# Patient Record
Sex: FEMALE | Race: BLACK OR AFRICAN AMERICAN
[De-identification: names, ages, dates, MRNs, and addresses within clinical notes are randomized per-mention and may not be internally consistent; named-entity substitution may affect disease eponyms.]

---

## 2017-12-21 ENCOUNTER — HOSPITAL ENCOUNTER (EMERGENCY)
Dept: HOSPITAL 62 - ER | Age: 32
LOS: 1 days | Discharge: HOME | End: 2017-12-22
Payer: MEDICAID

## 2017-12-21 VITALS — DIASTOLIC BLOOD PRESSURE: 103 MMHG | SYSTOLIC BLOOD PRESSURE: 160 MMHG

## 2017-12-21 DIAGNOSIS — I10: ICD-10-CM

## 2017-12-21 DIAGNOSIS — Z87.01: ICD-10-CM

## 2017-12-21 DIAGNOSIS — F17.200: ICD-10-CM

## 2017-12-21 DIAGNOSIS — R06.02: ICD-10-CM

## 2017-12-21 DIAGNOSIS — Z71.6: ICD-10-CM

## 2017-12-21 DIAGNOSIS — E66.01: ICD-10-CM

## 2017-12-21 DIAGNOSIS — J06.9: Primary | ICD-10-CM

## 2017-12-21 DIAGNOSIS — R05: ICD-10-CM

## 2017-12-21 PROCEDURE — 99283 EMERGENCY DEPT VISIT LOW MDM: CPT

## 2017-12-21 NOTE — ER DOCUMENT REPORT
ED General





- General


Chief Complaint: Cold Symptoms


Stated Complaint: COUGH,CONGESTION


Time Seen by Provider: 17 23:52


Notes: 





32-year-old female with morbid obesity and daily smoking presents with 

congestion cough and shortness of breath worse at night with subjective 

wheezing moderate.  3 days..  She is coughing up phlegm but no blood.  She has 

no leg swelling or chest pain.  This happens every year and she has had 

pneumonia before.


TRAVEL OUTSIDE OF THE [place] IN LAST 30 DAYS: No





- Related Data


Allergies/Adverse Reactions: 


 





No Known Allergies Allergy (Verified 17 15:17)


 











Past Medical History





- Social History


Smoking Status: Current Every Day Smoker


Chew tobacco use (# tins/day): No


Smoking Education Provided: Yes - The patient ED visit today was directly 

related to their abuse of tobacco. 


Frequency of alcohol use: Rare


Drug Abuse: None


Family History: Reviewed & Not Pertinent


Patient has suicidal ideation: No


Patient has homicidal ideation: No





- Past Medical History


Cardiac Medical History: Reports: Hx Hypertension


Pulmonary Medical History: Reports: Hx Bronchitis


   Denies: Hx Tuberculosis


Renal/ Medical History: Denies: Hx Kidney Stones, Hx Peritoneal Dialysis, Hx 

Pelvic Inflammatory Disease


Psychiatric Medical History: Reports: Hx Depression


Past Surgical History: Reports: Hx  Section - x1, Hx Cholecystectomy.  

Denies: Hx Pacemaker





- Immunizations


Hx Diphtheria, Pertussis, Tetanus Vaccination: Yes


Hx Pneumococcal Vaccination: 12





Review of Systems





- Review of Systems


Notes: 





REVIEW OF SYSTEMS





GEN: Denies fever, chills, weight loss


ENT: Denies sore throat, nasal discharge, ear pain


EYES: Denies blurry vision, eye pain, discharge


CV: Denies chest pain, palpitations, edema


RESP: Cough congestion shortness of breath


GI: Denies abdominal pain, nausea, vomiting, diarrhea


MSK: Denies joint pain/swelling, edema, 


SKIN: Denies rash, skin lesions


LYMPH: Denies swollen glands/lymph nodes


NEURO: Denies headache, focal weakness or numbness, dizziness


PSYCH: Denies depression, suicidal or homicidal ideation








PHYSICAL EXAMINATION





General: No distress, morbid obesity


Head: Atraumatic, normocephalic


ENT: Mouth normal, oropharynx moist, no exudates or tonsillar enlargement


Eyes: Conjunctiva normal, pupils equal, lids normal


Neck: No JVD, supple, no guarding


CVS: Normal rate, regular rhythm, no murmurs


Resp: No resp distress, equal and normal breath sounds bilaterally


GI: Nondistended, soft, no tenderness to palpation, no rebound or guarding


Ext: No deformities, no edema, normal range of motion in upper and lower ext


Back: No CVA or midline TTP


Skin: No rash, warm


Lymphatic: No lymphadeopathy noted


Neuro: Awake, alert.  Face symmetric.  GCS 15.





Physical Exam





- Vital signs


Vitals: 


 











Temp Pulse Resp BP Pulse Ox


 


 98.6 F   99   18   160/103 H  96 


 


 17 23:12  17 23:12  17 23:12  17 23:12  17 23:12














Course





- Re-evaluation


Re-evalutation: 





17 23:58


.  Morbidly obese smoker presents with cough congestion shortness of breath.  

She is not wheezing her oxygen level is normal, and she is well-appearing.  

Possible bronchitis but doubt pneumonia, no need for x-ray at this time.  Will 

prescribe inhaler and gave smoking cessation counseling.


I have discussed with the patient there likely diagnosis, aftercare plan, follow

-up plans and my usual and customary return precautions.  They verbalized 

understanding of this.





- Vital Signs


Vital signs: 


 











Temp Pulse Resp BP Pulse Ox


 


 98.6 F   99   18   160/103 H  96 


 


 17 23:12  17 23:12  17 23:12  17 23:12  17 23:12














Discharge





- Discharge


Clinical Impression: 


Upper respiratory infection


Qualifiers:


 URI type: unspecified URI Qualified Code(s): J06.9 - Acute upper respiratory 

infection, unspecified





Condition: Good


Disposition: HOME, SELF-CARE


Instructions:  Upper Respiratory Infection, Infant or Child ([facility])


Prescriptions: 


Albuterol Sulfate [Proair HFA Inhalation Aerosol 8.5 gm MDI] 2 puff IH Q4H PRN #

1 mdi


 PRN Reason:

## 2018-06-10 ENCOUNTER — HOSPITAL ENCOUNTER (EMERGENCY)
Dept: HOSPITAL 62 - ER | Age: 33
Discharge: HOME | End: 2018-06-10
Payer: MEDICAID

## 2018-06-10 VITALS — DIASTOLIC BLOOD PRESSURE: 105 MMHG | SYSTOLIC BLOOD PRESSURE: 157 MMHG

## 2018-06-10 DIAGNOSIS — I10: ICD-10-CM

## 2018-06-10 DIAGNOSIS — Z87.891: ICD-10-CM

## 2018-06-10 DIAGNOSIS — R05: ICD-10-CM

## 2018-06-10 DIAGNOSIS — B95.5: ICD-10-CM

## 2018-06-10 DIAGNOSIS — J36: Primary | ICD-10-CM

## 2018-06-10 DIAGNOSIS — H92.01: ICD-10-CM

## 2018-06-10 LAB
ADD MANUAL DIFF: NO
ANION GAP SERPL CALC-SCNC: 14 MMOL/L (ref 5–19)
BASOPHILS # BLD AUTO: 0.1 10^3/UL (ref 0–0.2)
BASOPHILS NFR BLD AUTO: 0.6 % (ref 0–2)
BUN SERPL-MCNC: 9 MG/DL (ref 7–20)
CALCIUM: 9.3 MG/DL (ref 8.4–10.2)
CHLORIDE SERPL-SCNC: 105 MMOL/L (ref 98–107)
CO2 SERPL-SCNC: 25 MMOL/L (ref 22–30)
EOSINOPHIL # BLD AUTO: 0 10^3/UL (ref 0–0.6)
EOSINOPHIL NFR BLD AUTO: 0.4 % (ref 0–6)
ERYTHROCYTE [DISTWIDTH] IN BLOOD BY AUTOMATED COUNT: 17 % (ref 11.5–14)
GLUCOSE SERPL-MCNC: 103 MG/DL (ref 75–110)
HCT VFR BLD CALC: 45.2 % (ref 36–47)
HGB BLD-MCNC: 14.2 G/DL (ref 12–15.5)
LYMPHOCYTES # BLD AUTO: 1.9 10^3/UL (ref 0.5–4.7)
LYMPHOCYTES NFR BLD AUTO: 15.4 % (ref 13–45)
MCH RBC QN AUTO: 22.9 PG (ref 27–33.4)
MCHC RBC AUTO-ENTMCNC: 31.5 G/DL (ref 32–36)
MCV RBC AUTO: 73 FL (ref 80–97)
MONOCYTES # BLD AUTO: 1 10^3/UL (ref 0.1–1.4)
MONOCYTES NFR BLD AUTO: 7.9 % (ref 3–13)
NEUTROPHILS # BLD AUTO: 9.2 10^3/UL (ref 1.7–8.2)
NEUTS SEG NFR BLD AUTO: 75.7 % (ref 42–78)
PLATELET # BLD: 295 10^3/UL (ref 150–450)
POTASSIUM SERPL-SCNC: 4 MMOL/L (ref 3.6–5)
RBC # BLD AUTO: 6.21 10^6/UL (ref 3.72–5.28)
SODIUM SERPL-SCNC: 144.4 MMOL/L (ref 137–145)
TOTAL CELLS COUNTED % (AUTO): 100 %
WBC # BLD AUTO: 12.2 10^3/UL (ref 4–10.5)

## 2018-06-10 PROCEDURE — 80048 BASIC METABOLIC PNL TOTAL CA: CPT

## 2018-06-10 PROCEDURE — 96365 THER/PROPH/DIAG IV INF INIT: CPT

## 2018-06-10 PROCEDURE — 85025 COMPLETE CBC W/AUTO DIFF WBC: CPT

## 2018-06-10 PROCEDURE — 84703 CHORIONIC GONADOTROPIN ASSAY: CPT

## 2018-06-10 PROCEDURE — 87880 STREP A ASSAY W/OPTIC: CPT

## 2018-06-10 PROCEDURE — 36415 COLL VENOUS BLD VENIPUNCTURE: CPT

## 2018-06-10 PROCEDURE — 87040 BLOOD CULTURE FOR BACTERIA: CPT

## 2018-06-10 PROCEDURE — 99284 EMERGENCY DEPT VISIT MOD MDM: CPT

## 2018-06-10 PROCEDURE — 70491 CT SOFT TISSUE NECK W/DYE: CPT

## 2018-06-10 PROCEDURE — 96361 HYDRATE IV INFUSION ADD-ON: CPT

## 2018-06-10 PROCEDURE — 96375 TX/PRO/DX INJ NEW DRUG ADDON: CPT

## 2018-06-10 NOTE — RADIOLOGY REPORT (SQ)
EXAM DESCRIPTION:  CT SOFT TISSUE NECK WITH



COMPLETED DATE/TIME:  6/10/2018 8:49 pm



REASON FOR STUDY:  swelling to the R throat/neck



COMPARISON:  None.



TECHNIQUE:  Post IV contrasted scanning from skull base through lung apices with review of bone, soft
 tissue and lung windows.  Reconstructed coronal and sagittal MPR images reviewed.  All images stored
 on PACS.

All CT scanners at this facility use dose modulation, iterative reconstruction, and/or weight based d
osing when appropriate to reduce radiation dose to as low as reasonably achievable (ALARA).

[facility]: Dose Right  [facility]: CareDose    [facility]: Dose Right    [facility]: Teradose 4D    [facility]: Smart Technologies



CONTRAST TYPE AND DOSE:  contrast/concentration: Isovue 370.00 mg/ml; Total Contrast Delivered: 75.0 
ml; Total Saline Delivered: 55.0 ml



RENAL FUNCTION:  None required. The patient is less than 50 years old.



RADIATION DOSE:  CT Rad equipment meets quality standard of care and radiation dose reduction techniq
ues were employed. CTDIvol: 21.0 mGy. DLP: 627 mGy-cm. .



LIMITATIONS:  There is artifact from the patient's body habitus.  There is streak artifact from the d
ental amalgam.



FINDINGS:  SKULL BASE: Intact.

MAJOR SALIVARY GLANDS:  No inflammatory changes.

LYMPHADENOPATHY: There are bilateral enlarged cervical lymph nodes.  A right internal jugular lymph c
jarret lymph node is measuring 1.9 x 2.0 cm.  The left internal jugular chain lymph node is measuring 1
.9 x 1.5 cm

MUCOSAL MASSES OR ASYMMETRY: The palatine tonsils are enlarged.  There is a 1.4 x 1.7 cm low attenuat
ion area at the right tonsil.  The adenoids are enlarged.

LARYNX/CORDS: No obvious abnormal findings.

VASCULAR STRUCTURES: The major vessels are patent.

LUNG APICES: Clear.

BONES: Intact.

THYROID: No obvious masses.

PARANASAL SINUSES: No air-fluid levels.

OTHER: Radiopaque piercing is noted at the soft tissues overlying the right maxilla.



IMPRESSION:  1. Enlarged adenoids and palatine tonsils. A 1.4 x 1.7 cm low attenuation area at the ri
ght tonsil, may represent a peritonsillar abscess.

2. Bilateral cervical adenopathy.



TECHNICAL DOCUMENTATION:  JOB ID:  [id number]

OH-

Quality ID # 436: Final reports with documentation of one or more dose reduction techniques (e.g., Au
tomated exposure control, adjustment of the mA and/or kV according to patient size, use of iterative 
reconstruction technique)

 2011 [employer]- All Rights Reserved



Reading location - IP/workstation name: [email or web address]

## 2018-06-10 NOTE — ER DOCUMENT REPORT
ED General





- General


Chief Complaint: Cold Symptoms


Stated Complaint: COUGHING/THROAT PAIN


Time Seen by Provider: 06/10/18 17:04


Mode of Arrival: Ambulatory


Notes: 





32-year-old female presents emergency department with sore throat, right ear 

pain, cough for the last 2 days.  Patient states that her daughter has similar 

symptoms.  Patient's been taking over-the-counter medication with minimal 

relief of symptoms.  Patient states that she is able to swallow and has been 

eating and drinking despite her throat pain.


TRAVEL OUTSIDE OF THE [place] IN LAST 30 DAYS: No





- HPI


Onset: Last week


Onset/Duration: Gradual


Quality of pain: Achy


Severity: Mild


Associated symptoms: Nonproductive cough, Earache, Sore throat


Exacerbated by: Denies


Relieved by: Denies


Similar symptoms previously: No


Recently seen / treated by doctor: No





- Related Data


Allergies/Adverse Reactions: 


 





No Known Allergies Allergy (Verified 17 15:17)


 











Past Medical History





- General


Information source: Patient





- Social History


Smoking Status: Former Smoker


Family History: Reviewed & Not Pertinent





- Past Medical History


Cardiac Medical History: Reports: Hx Hypertension


Pulmonary Medical History: Reports: Hx Bronchitis


   Denies: Hx Tuberculosis


Renal/ Medical History: Denies: Hx Kidney Stones, Hx Peritoneal Dialysis, Hx 

Pelvic Inflammatory Disease


Psychiatric Medical History: Reports: Hx Depression


Past Surgical History: Reports: Hx  Section - x1, Hx Cholecystectomy.  

Denies: Hx Pacemaker





- Immunizations


Hx Diphtheria, Pertussis, Tetanus Vaccination: Yes


Hx Pneumococcal Vaccination: 12





Review of Systems





- Review of Systems


Constitutional: No symptoms reported


EENT: Throat pain


Cardiovascular: No symptoms reported


Respiratory: Cough


Gastrointestinal: No symptoms reported


Genitourinary: No symptoms reported


Musculoskeletal: No symptoms reported


Skin: No symptoms reported


Neurological/Psychological: No symptoms reported


-: Yes All other systems reviewed and negative





Physical Exam





- Vital signs


Vitals: 


 











Temp Pulse Resp BP Pulse Ox


 


 99.6 F   109 H  20   161/126 H  99 


 


 06/10/18 17:01  06/10/18 17:01  06/10/18 17:01  06/10/18 17:01  06/10/18 17:01














- Notes


Notes: 





PHYSICAL EXAMINATION:





GENERAL: Well-appearing, well-nourished and in no acute distress.





HEAD: Atraumatic, normocephalic.





EYES: Pupils equal round and reactive to light, extraocular movements intact, 

conjunctiva are normal.





ENT: Nares patent, No trismus. Erythema to the R posterior pharynx. Uvula 

midline. Tender anterior cervical lymph nodes. Handling secretions. 





NECK: Normal range of motion, supple, anterior cervical lymphadenopathy. 





LUNGS: Breath sounds clear to auscultation bilaterally and equal.  No wheezes 

rales or rhonchi.





HEART: Regular rate and rhythm without murmurs





ABDOMEN: Soft, nontender, nondistended abdomen.  No guarding, no rebound.  No 

masses appreciated.





Female : deferred





Musculoskeletal: Normal range of motion, no pitting or edema.  No cyanosis.





NEUROLOGICAL: Cranial nerves grossly intact.  Normal speech, normal gait.  

Normal sensory, motor exams





PSYCH: Normal mood, normal affect.





SKIN: Warm, Dry, normal turgor, no rashes or lesions noted.





Course





- Re-evaluation


Re-evalutation: 





06/10/18 22:45


I spoke with Dr. Freddy Miller, ENT, at [facility]. As the patient is not 

having difficulty breathing or swallowing and the size of the peritonsillar 

abscess is small, he would like the patient to follow up in his office tomorrow 

AM. He says he can see the patient at 8AM. I will provide the patient with his 

office address and phone number. Patient received clindamycin and steroids in 

the ED. He would like the patient to be discharged with a rx for clindamycin 

and predisone. I discussed the plan of care with the patient. She has 

transportation to Dr. Miller's office in the AM. Patient is reliable. Patient 

told that if she begins having difficulty breathing or swallowing to return to 

the ED immediately. 





- Vital Signs


Vital signs: 


 











Temp Pulse Resp BP Pulse Ox


 


 99.6 F   109 H  21 H  157/105 H  95 


 


 06/10/18 17:01  06/10/18 17:01  06/10/18 20:01  06/10/18 20:01  06/10/18 20:01














- Laboratory


Result Diagrams: 


 06/10/18 18:00





 06/10/18 18:00


Laboratory results interpreted by me: 


 











  06/10/18





  18:00


 


WBC  12.2 H


 


RBC  6.21 H


 


MCV  73 L


 


MCH  22.9 L


 


MCHC  31.5 L


 


RDW  17.0 H


 


Absolute Neutrophils  9.2 H














Discharge





- Discharge


Clinical Impression: 


 Peritonsillar abscess, Strep throat





Condition: Stable


Disposition: HOME, SELF-CARE


Instructions:  Strep Throat ([facility]), Nina-Tonsillar Abscess ([facility])


Additional Instructions: 


Follow up with Dr. Freddy Miller in the office tomorrow:





[address] [place], [address] [phone]


Prescriptions: 


Clindamycin HCl [Cleocin 300 mg Capsule] 300 mg PO Q6 #28 capsule


Prednisone 50 mg PO DAILY #5 tablet


Referrals: 


MARYCRUZ OSEGUERA PA-C [Primary Care Provider] - Follow up as needed


FREDDY MILLER MD [ACTIVE STAFF] - Follow up as needed

## 2018-06-10 NOTE — ER DOCUMENT REPORT
ED Medical Screen (RME)





- General


Chief Complaint: Cold Symptoms


Stated Complaint: COUGHING/THROAT PAIN


Time Seen by Provider: 06/10/18 17:04


Mode of Arrival: Ambulatory


Notes: 





Patient is a 32-year-old female who presents emergency department the chief 

complaint of sore throat that started on Friday.  Patient admits to muffled 

speech starting yesterday and inability to tolerate secretions for the past 48 

hours.  Patient denies any fevers and admits to shortness of breath.


TRAVEL OUTSIDE OF THE [place] IN LAST 30 DAYS: No





- Related Data


Allergies/Adverse Reactions: 


 





No Known Allergies Allergy (Verified 17 15:17)


 











Past Medical History





- Past Medical History


Cardiac Medical History: Reports: Hx Hypertension


Pulmonary Medical History: Reports: Hx Bronchitis


   Denies: Hx Tuberculosis


Renal/ Medical History: Denies: Hx Kidney Stones, Hx Peritoneal Dialysis, Hx 

Pelvic Inflammatory Disease


Psychiatric Medical History: Reports: Hx Depression


Past Surgical History: Reports: Hx  Section - x1, Hx Cholecystectomy.  

Denies: Hx Pacemaker





- Immunizations


Hx Diphtheria, Pertussis, Tetanus Vaccination: Yes





Physical Exam





- Vital signs


Vitals: 





 











Temp Pulse Resp BP Pulse Ox


 


 99.6 F   109 H  20   161/126 H  99 


 


 06/10/18 17:01  06/10/18 17:01  06/10/18 17:01  06/10/18 17:01  06/10/18 17:01














- Notes


Notes: 





HEENT: NCAT, pale conjunctiva, extraocular movements intact, pupils PERRL. 

external ear normal, no evidence of external auditory canal tenderness, blood/

drainage, cerumen impaction, TM intact without evidence of effusion, bulging, 

injection, MMM, Uvula midline.  Airway patent. Evidence of right tonsillar 

enlargement, no visible peritonsillar abscess, retropharyngeal abscess.





patients voice is muffled and continuously spitting into a cup 





Course





- Vital Signs


Vital signs: 





 











Temp Pulse Resp BP Pulse Ox


 


 99.6 F   109 H  20   161/126 H  99 


 


 06/10/18 17:01  06/10/18 17:01  06/10/18 17:01  06/10/18 17:01  06/10/18 17:01














Doctor's Discharge





- Discharge


Referrals: 


MARYCRUZ OSEGUERA PA-C [Primary Care Provider] - Follow up as needed

## 2018-06-10 NOTE — ER DOCUMENT REPORT
ED Medical Screen (RME)





- General


Chief Complaint: Cold Symptoms


Stated Complaint: COUGHING/THROAT PAIN


Time Seen by Provider: 06/10/18 17:04


Mode of Arrival: Ambulatory


Information source: Patient


Notes: 





32-year-old morbidly obese female is complaining of sore throat tonsillitis 

since Friday.  She did see her primary care doctor on Friday but the symptoms 

started after that.  She is unable to eat or drink because of the pain.  She 

does have hot potato voice but upon exam she does not have a peritonsillar 

abscess there is increased redness in the soft palate on the right but the 

uvula is midline and there is no edema.  Bilateral tonsils are exudative with 

tender anterior cervical nodes.  Her blood pressure is elevated 161/126 she 

states she took her blood pressure medicine this morning but she states it is 

like this because of the pain.  We will recheck it and she denies diabetes.


TRAVEL OUTSIDE OF THE [place] IN LAST 30 DAYS: No





- Related Data


Allergies/Adverse Reactions: 


 





No Known Allergies Allergy (Verified 17 15:17)


 











Past Medical History





- Past Medical History


Cardiac Medical History: Reports: Hx Hypertension


Pulmonary Medical History: Reports: Hx Bronchitis


   Denies: Hx Tuberculosis


Renal/ Medical History: Denies: Hx Kidney Stones, Hx Peritoneal Dialysis, Hx 

Pelvic Inflammatory Disease


Psychiatric Medical History: Reports: Hx Depression


Past Surgical History: Reports: Hx  Section - x1, Hx Cholecystectomy.  

Denies: Hx Pacemaker





- Immunizations


Hx Diphtheria, Pertussis, Tetanus Vaccination: Yes





Physical Exam





- Vital signs


Vitals: 





 











Temp Pulse Resp BP Pulse Ox


 


 99.6 F   109 H  20   161/126 H  99 


 


 06/10/18 17:01  06/10/18 17:01  06/10/18 17:01  06/10/18 17:01  06/10/18 17:01














Course





- Vital Signs


Vital signs: 





 











Temp Pulse Resp BP Pulse Ox


 


 99.6 F   109 H  20   161/126 H  99 


 


 06/10/18 17:01  06/10/18 17:01  06/10/18 17:01  06/10/18 17:01  06/10/18 17:01














Doctor's Discharge





- Discharge


Referrals: 


MARYCRUZ OSEGUERA PA-C [Primary Care Provider] - Follow up as needed

## 2018-06-10 NOTE — ER DOCUMENT REPORT
HPI





- HPI


Pain Level: 3





- REPRODUCTIVE


Reproductive: REPORTS: Pregnant:





Past Medical History





- Social History


Family History: Reviewed & Not Pertinent





- Past Medical History


Cardiac Medical History: Reports: Hx Hypertension


Pulmonary Medical History: Reports: Hx Bronchitis


   Denies: Hx Tuberculosis


Renal/ Medical History: Denies: Hx Kidney Stones, Hx Peritoneal Dialysis, Hx 

Pelvic Inflammatory Disease


Psychiatric Medical History: Reports: Hx Depression


Past Surgical History: Reports: Hx  Section - x1, Hx Cholecystectomy.  

Denies: Hx Pacemaker





- Immunizations


Hx Diphtheria, Pertussis, Tetanus Vaccination: Yes


Hx Pneumococcal Vaccination: 12





Vertical Provider Document





- INFECTION CONTROL


TRAVEL OUTSIDE OF THE [place] IN LAST 30 DAYS: No





Course





- Vital Signs


Vital signs: 


 











Temp Pulse Resp BP Pulse Ox


 


 99.6 F   109 H  20   161/126 H  99 


 


 06/10/18 17:01  06/10/18 17:01  06/10/18 17:01  06/10/18 17:01  06/10/18 17:01














Discharge





- Discharge


Referrals: 


MARYCRUZ OSEGUERA PA-C [Primary Care Provider] - Follow up as needed

## 2019-04-02 ENCOUNTER — HOSPITAL ENCOUNTER (EMERGENCY)
Dept: HOSPITAL 62 - ER | Age: 34
Discharge: HOME | End: 2019-04-02
Payer: MEDICAID

## 2019-04-02 VITALS — DIASTOLIC BLOOD PRESSURE: 123 MMHG | SYSTOLIC BLOOD PRESSURE: 157 MMHG

## 2019-04-02 DIAGNOSIS — F17.200: ICD-10-CM

## 2019-04-02 DIAGNOSIS — E66.9: ICD-10-CM

## 2019-04-02 DIAGNOSIS — Z79.899: ICD-10-CM

## 2019-04-02 DIAGNOSIS — I10: ICD-10-CM

## 2019-04-02 DIAGNOSIS — M25.512: Primary | ICD-10-CM

## 2019-04-02 PROCEDURE — 93005 ELECTROCARDIOGRAM TRACING: CPT

## 2019-04-02 PROCEDURE — 93010 ELECTROCARDIOGRAM REPORT: CPT

## 2019-04-02 PROCEDURE — 99283 EMERGENCY DEPT VISIT LOW MDM: CPT

## 2019-04-02 NOTE — ER DOCUMENT REPORT
HPI





- HPI


Time Seen by Provider: 19 16:06


Pain Level: 3


Notes: 





Patient is a 33-year-old female with no significant past medical history aside 

from hypertension and obesity who presents emergency department complaining of 

left pain near her deltoid that has been constant over the last day and a half. 

Patient states that the pain is worse after she gets done performing activities,

but is described as mild.  Pain does not radiate.  She can isolate the one spot 

that hurts when she pushes on it to reproduce her symptoms.  Denies drug 

allergies.  No significant cardiopulmonary medical history.  She is otherwise 

eating and drinking without difficulty.  She is urinating normally and having 

normal bowel movements.  She is able to ambulate without any worsening pain or 

dyspnea on exertion.  She has not taken her blood pressure medicine in a couple 

weeks because she has been out.  She is on lisinopril 10 mg daily.  Denies any 

headache, fever, neck pain, URI, sore throat, chest pain, palpitations, syncope,

cough, shortness of breath, wheeze, dyspnea, abdominal pain, 

nausea/vomiting/diarrhea, urinary retention, dysuria, hematuria, loss of control

of bowel or bladder, numbness/tingling, saddle anesthesia, muscle 

paralysis/weakness, or rash.





- ROS


Systems Reviewed and Negative: Yes All other systems reviewed and negative





- REPRODUCTIVE


Reproductive: DENIES: Pregnant:





- DERM


Skin Color: Normal





Past Medical History





- Social History


Smoking Status: Current Every Day Smoker


Frequency of alcohol use: None


Drug Abuse: None


Family History: Reviewed & Not Pertinent


Patient has suicidal ideation: No


Patient has homicidal ideation: No





- Past Medical History


Cardiac Medical History: Reports: Hx Hypertension


Pulmonary Medical History: Reports: Hx Bronchitis


   Denies: Hx Tuberculosis


Renal/ Medical History: Denies: Hx Kidney Stones, Hx Peritoneal Dialysis, Hx 

Pelvic Inflammatory Disease


Psychiatric Medical History: Reports: Hx Depression


Past Surgical History: Reports: Hx  Section - x1, Hx Cholecystectomy.  

Denies: Hx Pacemaker





- Immunizations


Hx Diphtheria, Pertussis, Tetanus Vaccination: Yes


Hx Pneumococcal Vaccination: 12





Vertical Provider Document





- CONSTITUTIONAL


Agree With Documented VS: Yes


Notes: 





PHYSICAL EXAMINATION:





GENERAL: Well-appearing, well-nourished and in no acute distress.





HEAD: Atraumatic, normocephalic.





EYES: Pupils equal round and reactive to light, extraocular movements intact, 

sclera anicteric, conjunctiva are normal.





ENT:  Nares patent and without discharge.  oropharynx clear without exudates.  

No tonsilar hypertrophy or erythema.  Moist mucous membranes. 





NECK: Normal range of motion, supple without lymphadenopathy





LUNGS: Breath sounds clear to auscultation bilaterally and equal.  No wheezes 

rales or rhonchi.





HEART: Regular rate and rhythm without murmurs, rubs, gallops.





Musculoskeletal: + reproducible tenderness when palpating the insertion of the 

deltoid muscle near the deltoid bursa.  FROM to passive/active. Strength 5+/5. 

No asymmetry to the UE's b/l.  N/V intact distal.





Extremities:  No cyanosis, clubbing, or edema b/l.  Peripheral pulses 2+.  

Capillary refill less than 3 seconds.





NEUROLOGICAL: Normal speech, normal gait.  





PSYCH: Normal mood, normal affect.





SKIN: Warm, Dry, normal turgor, no rashes or lesions noted.





- INFECTION CONTROL


TRAVEL OUTSIDE OF THE [place] IN LAST 30 DAYS: No





Course





- Re-evaluation


Re-evalutation: 





19 


Patient is an afebrile, well-hydrated, 33-year-old female who presents emergency

department with left deltoid pain, suspect bursitis versus tendinitis.  Vitals 

are acceptable without significant tachycardia, tachypnea, or hypoxia.  PE is 

otherwise unremarkable for any neurovascular compromise, obvious tendon/ligament

rupture, obvious fracture/dislocation, septic joint, DVT.  Patient has 

reproducible tenderness at the deltoid bursa which correlates with her pain 

described.  Patient otherwise does not have any and has not had any chest pain, 

dyspnea on exertion, shortness of breath.  There is no asymmetry to her upper 

extremities or other evidence of DVT as her symptoms are on the lateral arm and 

not medial.  She is nontoxic-appearing and tolerating p.o. without difficulty.  

EKG unremarkable.  No labs or other imaging warranted.  Low suspicion for any 

ACS, PE, pneumothorax, pericarditis, dissection, or otherwise stated above.  

Patient aware that condition can change and she needs to monitor symptoms and 

seek medical attention with acute changes.  Patient to recheck with your PCM in 

2-3 days.  Return to the ED with any worsening/concerning symptoms.  Patient is 

in agreement.





- Vital Signs


Vital signs: 


                                        











Temp Pulse Resp BP Pulse Ox


 


 98.1 F   93   18   157/123 H  100 


 


 19 15:54  19 15:54  19 15:54  19 15:54  19 15:54














Discharge





- Discharge


Clinical Impression: 


 Pain of left deltoid





Condition: Stable


Disposition: HOME, SELF-CARE


Additional Instructions: 


Rest, Ice, Compression


Tylenol/ibuprofen as needed


Light stretches daily


Strength exercises as able


Moist heat and massage may help


F/u with your PCP in 2-3 days for a recheck


Consider consult(s) with Orthopedics/physical therapy for ongoing/worsening 

symptoms





Return to the ED with any worsening symptoms and/or development of fever, 

headache, chest pain, palpitations, syncope, shortness of breath, trouble 

breathing, abdominal pain, n/v/d, muscle weakness/paralysis, numbness/tingling, 

swelling, redness, or other worsening symptoms that are concerning to you.


Prescriptions: 


Lisinopril [Prinivil 10 mg Tablet] 10 mg PO DAILY #15 tablet


Forms:  Elevated Blood Pressure


Referrals: 


MARYCRUZ OSEGUERA PA-C [Primary Care Provider] - 19


[facility] ([facility] [Provider Group] - Follow up as needed

## 2019-04-02 NOTE — EKG REPORT
SEVERITY:- BORDERLINE ECG -

SINUS RHYTHM

NONSPECIFIC ST-T CHANGES- INFERIOR LEADS

:

Confirmed by: Ravinder Ramirez MD 02-Apr-2019 17:25:22

## 2019-10-14 ENCOUNTER — HOSPITAL ENCOUNTER (EMERGENCY)
Dept: HOSPITAL 62 - ER | Age: 34
Discharge: HOME | End: 2019-10-14
Payer: MEDICAID

## 2019-10-14 VITALS — SYSTOLIC BLOOD PRESSURE: 141 MMHG | DIASTOLIC BLOOD PRESSURE: 90 MMHG

## 2019-10-14 DIAGNOSIS — E66.01: ICD-10-CM

## 2019-10-14 DIAGNOSIS — B35.6: Primary | ICD-10-CM

## 2019-10-14 DIAGNOSIS — F17.200: ICD-10-CM

## 2019-10-14 DIAGNOSIS — Z20.2: ICD-10-CM

## 2019-10-14 DIAGNOSIS — Z90.49: ICD-10-CM

## 2019-10-14 DIAGNOSIS — L30.9: ICD-10-CM

## 2019-10-14 DIAGNOSIS — I10: ICD-10-CM

## 2019-10-14 LAB
APPEARANCE UR: (no result)
APTT PPP: (no result) S
BACTERIA (WET MOUNT): (no result)
BILIRUB UR QL STRIP: NEGATIVE
CHLAM PCR: NOT DETECTED
EPITHELIALS (WET MOUNT): (no result)
GLUCOSE UR STRIP-MCNC: NEGATIVE MG/DL
KETONES UR STRIP-MCNC: NEGATIVE MG/DL
NITRITE UR QL STRIP: NEGATIVE
PH UR STRIP: 5 [PH] (ref 5–9)
PROT UR STRIP-MCNC: 30 MG/DL
RBCS (WET MOUNT): (no result)
SP GR UR STRIP: 1.02
T.VAGINALIS (WET MOUNT): (no result)
UROBILINOGEN UR-MCNC: NEGATIVE MG/DL (ref ?–2)
WBCS (WET MOUNT): (no result)
YEAST (WET MOUNT): (no result)

## 2019-10-14 PROCEDURE — 87210 SMEAR WET MOUNT SALINE/INK: CPT

## 2019-10-14 PROCEDURE — 87591 N.GONORRHOEAE DNA AMP PROB: CPT

## 2019-10-14 PROCEDURE — 99283 EMERGENCY DEPT VISIT LOW MDM: CPT

## 2019-10-14 PROCEDURE — 81025 URINE PREGNANCY TEST: CPT

## 2019-10-14 PROCEDURE — 81001 URINALYSIS AUTO W/SCOPE: CPT

## 2019-10-14 PROCEDURE — 96372 THER/PROPH/DIAG INJ SC/IM: CPT

## 2019-10-14 PROCEDURE — 87491 CHLMYD TRACH DNA AMP PROBE: CPT

## 2019-10-14 NOTE — ER DOCUMENT REPORT
ED Skin Rash/Insect Bite/Abscs





- General


Chief Complaint: Rash


Stated Complaint: RASH


Time Seen by Provider: 10/14/19 08:46


Primary Care Provider: 


EVI MORRISON DO [ACTIVE STAFF] - Follow up in 1 week (for dermatology follow 

up)


MARYCRUZ OSEGUREA PA-C [Primary Care Provider] - Follow up in 1 week


TRAVEL OUTSIDE OF THE [place] IN LAST 30 DAYS: No





- HPI


Notes: 





33 year old female to the ED with an itchy rash all over her body and possible 

yeast rash in her groin for the past three days.  States that she has "bites all

over her arms and legs".  States she began to feel itchy and "wet" in her groin 

three days ago as well.  Does report vaginal discharge and would like to be 

tested and treated for STD.  States she has one partner and does not use any 

protection.  Denies any other complaints. 





- Related Data


Allergies/Adverse Reactions: 


                                        





No Known Allergies Allergy (Verified 19 15:49)


   











Past Medical History





- General


Information source: Patient





- Social History


Smoking Status: Current Every Day Smoker


Frequency of alcohol use: Occasional


Drug Abuse: None


Family History: Reviewed & Not Pertinent


Patient has suicidal ideation: No


Patient has homicidal ideation: No





- Past Medical History


Cardiac Medical History: Reports: Hx Hypertension


Pulmonary Medical History: Reports: Hx Bronchitis


   Denies: Hx Tuberculosis


Renal/ Medical History: Denies: Hx Kidney Stones, Hx Peritoneal Dialysis, Hx 

Pelvic Inflammatory Disease


Psychiatric Medical History: Reports: Hx Depression


Past Surgical History: Reports: Hx  Section - x1, Hx Cholecystectomy.  

Denies: Hx Pacemaker





- Immunizations


Hx Diphtheria, Pertussis, Tetanus Vaccination: Yes


Hx Pneumococcal Vaccination: 12





Review of Systems





- Review of Systems


Constitutional: denies: Chills, Fever


EENT: No symptoms reported


Cardiovascular: denies: Chest pain, Palpitations, Dyspnea, Syncope, Dizziness


Respiratory: denies: Cough, Short of breath


Gastrointestinal: denies: Abdominal pain, Diarrhea, Nausea, Vomiting


Genitourinary: denies: Frequency, Flank pain, Hematuria, Incontinence


Female Genitourinary: See HPI, Vaginal discharge


Skin: See HPI, Rash


Neurological/Psychological: No symptoms reported


-: Yes All other systems reviewed and negative





Physical Exam





- Vital signs


Vitals: 


                                        











Temp Pulse Resp BP Pulse Ox


 


 98.2 F   105 H  20   159/130 H  98 


 


 10/14/19 08:04  10/14/19 08:04  10/14/19 08:04  10/14/19 08:04  10/14/19 08:04











Interpretation: Normal





- General


General appearance: Appears well, Alert


Notes: 





morbidly obese





- HEENT


Head: Normocephalic, Atraumatic


Eyes: Normal


Pupils: PERRL





- Respiratory


Respiratory status: No respiratory distress


Chest status: Nontender


Breath sounds: Normal


Chest palpation: Normal





- Cardiovascular


Rhythm: Regular


Heart sounds: Normal auscultation


Murmur: No





- Abdominal


Inspection: Normal


Distension: No distension


Bowel sounds: Normal


Tenderness: Nontender


Organomegaly: No organomegaly





- Genitourinary


External exam: Normal


Bimanuel exam: Normal.  No: Cervical motion tender, Bladder/Urethral tender, 

Adnexal mass, Adnexal tenderness, Uterus enlarged


Notes: 





chaperoned by RN.   Swabs obtained for wet prep, GC/chlamydia.  To the groin 

there is a well demarcated erythematous rash with satellite lesions.  no 

superimposed infection





- Back


Back: Normal, Nontender.  No: CVA tenderness





- Neurological


Neuro grossly intact: Yes


Cognition: Normal


Orientation: AAOx4


Dian Coma Scale Eye Opening: Spontaneous


Hope Coma Scale Verbal: Oriented


Dian Coma Scale Motor: Obeys Commands


Hope Coma Scale Total: 15


Speech: Normal


Motor strength normal: LUE, RUE, LLE, RLE


Sensory: Normal





- Psychological


Associated symptoms: Normal affect, Normal mood





- Skin


Skin Temperature: Warm


Skin Color: Normal


Skin irregularity: Rash - there are multiple areas of excoriation to the skin to

the arms, legs, abdomen.  There are no palpulesor involvement of the soles and 

palms of the feet.  no superimposed infections.  Noted excoriations.





Course





- Re-evaluation


Re-evalutation: 





Impression:  Tinea cruris, will treat with nystatin powder.   Dermatitis to the 

body. will send home with benadryl.  Patient agrees with the plan.  Will give 

Derm follow up.  STD checking -- she would like empiric treatment for GC/Chlam. 

PCP follow up.








- Vital Signs


Vital signs: 


                                        











Temp Pulse Resp BP Pulse Ox


 


 97.6 F   75   20   141/90 H  96 


 


 10/14/19 11:30  10/14/19 11:30  10/14/19 11:30  10/14/19 11:30  10/14/19 11:30














- Laboratory


Laboratory results interpreted by me: 


                                        











  10/14/19





  10:25


 


Urine Protein  30 H


 


Urine Blood  SMALL H


 


Ur Leukocyte Esterase  LARGE H














Discharge





- Discharge


Clinical Impression: 


 Screening examination for STD (sexually transmitted disease), Dermatitis, Tinea

cruris





Condition: Stable


Disposition: HOME, SELF-CARE


Instructions:  Contact Dermatitis ([facility])


Additional Instructions: 


TAke medicines as prescribed.  Follow up with dermatologist listed.  Follow up 

with primary care.  Practice safe sex. 


Prescriptions: 


Hydroxyzine HCl [Atarax 50 mg Tablet] 50 mg PO PRN PRN #14 tablet


 PRN Reason: 


Fluconazole [Diflucan] 150 mg PO ONCE PRN #1 tablet


 PRN Reason: 


Clotrimazole/Betamethasone Dip [Lotrisone Cream 15 gm] 1 applic TP BID #1 tube


Referrals: 


MARYCRUZ OSEGUERA PA-C [Primary Care Provider] - Follow up in 1 week


EVI MORRISON DO [ACTIVE STAFF] - Follow up in 1 week (for dermatology follow 

up)

## 2020-04-23 ENCOUNTER — HOSPITAL ENCOUNTER (INPATIENT)
Dept: HOSPITAL 62 - ER | Age: 35
LOS: 20 days | Discharge: NURSING, REHAB OR LONG-TERM CARE | DRG: 853 | End: 2020-05-13
Attending: INTERNAL MEDICINE | Admitting: INTERNAL MEDICINE
Payer: MEDICAID

## 2020-04-23 DIAGNOSIS — K61.1: ICD-10-CM

## 2020-04-23 DIAGNOSIS — B37.2: ICD-10-CM

## 2020-04-23 DIAGNOSIS — E87.6: ICD-10-CM

## 2020-04-23 DIAGNOSIS — Z79.899: ICD-10-CM

## 2020-04-23 DIAGNOSIS — B96.20: ICD-10-CM

## 2020-04-23 DIAGNOSIS — E11.52: ICD-10-CM

## 2020-04-23 DIAGNOSIS — E66.01: ICD-10-CM

## 2020-04-23 DIAGNOSIS — Z87.891: ICD-10-CM

## 2020-04-23 DIAGNOSIS — E87.5: ICD-10-CM

## 2020-04-23 DIAGNOSIS — M72.6: ICD-10-CM

## 2020-04-23 DIAGNOSIS — B95.2: ICD-10-CM

## 2020-04-23 DIAGNOSIS — N17.9: ICD-10-CM

## 2020-04-23 DIAGNOSIS — Z79.84: ICD-10-CM

## 2020-04-23 DIAGNOSIS — F32.9: ICD-10-CM

## 2020-04-23 DIAGNOSIS — B96.7: ICD-10-CM

## 2020-04-23 DIAGNOSIS — I10: ICD-10-CM

## 2020-04-23 DIAGNOSIS — A41.9: Primary | ICD-10-CM

## 2020-04-23 PROCEDURE — 82565 ASSAY OF CREATININE: CPT

## 2020-04-23 PROCEDURE — 87077 CULTURE AEROBIC IDENTIFY: CPT

## 2020-04-23 PROCEDURE — 84443 ASSAY THYROID STIM HORMONE: CPT

## 2020-04-23 PROCEDURE — 96365 THER/PROPH/DIAG IV INF INIT: CPT

## 2020-04-23 PROCEDURE — 96366 THER/PROPH/DIAG IV INF ADDON: CPT

## 2020-04-23 PROCEDURE — 82962 GLUCOSE BLOOD TEST: CPT

## 2020-04-23 PROCEDURE — 80202 ASSAY OF VANCOMYCIN: CPT

## 2020-04-23 PROCEDURE — 82803 BLOOD GASES ANY COMBINATION: CPT

## 2020-04-23 PROCEDURE — 00300 ANES ALL PX INTEG H/N/PTRUNK: CPT

## 2020-04-23 PROCEDURE — 85610 PROTHROMBIN TIME: CPT

## 2020-04-23 PROCEDURE — 94668 MNPJ CHEST WALL SBSQ: CPT

## 2020-04-23 PROCEDURE — 83605 ASSAY OF LACTIC ACID: CPT

## 2020-04-23 PROCEDURE — 83036 HEMOGLOBIN GLYCOSYLATED A1C: CPT

## 2020-04-23 PROCEDURE — 87075 CULTR BACTERIA EXCEPT BLOOD: CPT

## 2020-04-23 PROCEDURE — 80053 COMPREHEN METABOLIC PANEL: CPT

## 2020-04-23 PROCEDURE — 99285 EMERGENCY DEPT VISIT HI MDM: CPT

## 2020-04-23 PROCEDURE — 80061 LIPID PANEL: CPT

## 2020-04-23 PROCEDURE — S0028 INJECTION, FAMOTIDINE, 20 MG: HCPCS

## 2020-04-23 PROCEDURE — 87205 SMEAR GRAM STAIN: CPT

## 2020-04-23 PROCEDURE — 94799 UNLISTED PULMONARY SVC/PX: CPT

## 2020-04-23 PROCEDURE — 85025 COMPLETE CBC W/AUTO DIFF WBC: CPT

## 2020-04-23 PROCEDURE — 96367 TX/PROPH/DG ADDL SEQ IV INF: CPT

## 2020-04-23 PROCEDURE — 94660 CPAP INITIATION&MGMT: CPT

## 2020-04-23 PROCEDURE — 87186 SC STD MICRODIL/AGAR DIL: CPT

## 2020-04-23 PROCEDURE — 84703 CHORIONIC GONADOTROPIN ASSAY: CPT

## 2020-04-23 PROCEDURE — 96375 TX/PRO/DX INJ NEW DRUG ADDON: CPT

## 2020-04-23 PROCEDURE — 81001 URINALYSIS AUTO W/SCOPE: CPT

## 2020-04-23 PROCEDURE — 36415 COLL VENOUS BLD VENIPUNCTURE: CPT

## 2020-04-23 PROCEDURE — 76882 US LMTD JT/FCL EVL NVASC XTR: CPT

## 2020-04-23 PROCEDURE — 85027 COMPLETE CBC AUTOMATED: CPT

## 2020-04-23 PROCEDURE — 80048 BASIC METABOLIC PNL TOTAL CA: CPT

## 2020-04-23 PROCEDURE — 87150 DNA/RNA AMPLIFIED PROBE: CPT

## 2020-04-23 PROCEDURE — 87040 BLOOD CULTURE FOR BACTERIA: CPT

## 2020-04-23 PROCEDURE — 87635 SARS-COV-2 COVID-19 AMP PRB: CPT

## 2020-04-23 PROCEDURE — 87070 CULTURE OTHR SPECIMN AEROBIC: CPT

## 2020-04-24 LAB
%HYPO/RBC NFR BLD AUTO: SLIGHT %
ABSOLUTE LYMPHOCYTES# (MANUAL): 2.3 10^3/UL (ref 0.5–4.7)
ABSOLUTE MONOCYTES # (MANUAL): 0.9 10^3/UL (ref 0.1–1.4)
ADD MANUAL DIFF: YES
ALBUMIN SERPL-MCNC: 3.1 G/DL (ref 3.5–5)
ALP SERPL-CCNC: 156 U/L (ref 38–126)
ANION GAP SERPL CALC-SCNC: 8 MMOL/L (ref 5–19)
ANISOCYTOSIS BLD QL SMEAR: (no result)
APPEARANCE UR: (no result)
APTT PPP: (no result) S
AST SERPL-CCNC: 58 U/L (ref 14–36)
BASE EXCESS BLDV CALC-SCNC: 1.5 MMOL/L
BASOPHILS NFR BLD MANUAL: 0 % (ref 0–2)
BILIRUB DIRECT SERPL-MCNC: 0.2 MG/DL (ref 0–0.4)
BILIRUB SERPL-MCNC: 0.4 MG/DL (ref 0.1–1.1)
BILIRUB SERPL-MCNC: 0.6 MG/DL (ref 0.2–1.3)
BILIRUB UR QL STRIP: NEGATIVE
BUN SERPL-MCNC: 17 MG/DL (ref 7–20)
CALCIUM: 8.4 MG/DL (ref 8.4–10.2)
CHLORIDE SERPL-SCNC: 100 MMOL/L (ref 98–107)
CO2 SERPL-SCNC: 25 MMOL/L (ref 22–30)
DACRYOCYTES BLD QL SMEAR: SLIGHT
EOSINOPHIL NFR BLD MANUAL: 0 % (ref 0–6)
ERYTHROCYTE [DISTWIDTH] IN BLOOD BY AUTOMATED COUNT: 16.9 % (ref 11.5–14)
GLUCOSE SERPL-MCNC: 156 MG/DL (ref 75–110)
GLUCOSE UR STRIP-MCNC: NEGATIVE MG/DL
HCO3 BLDV-SCNC: 25.5 MMOL/L (ref 20–32)
HCT VFR BLD CALC: 34.7 % (ref 36–47)
HGB BLD-MCNC: 11.5 G/DL (ref 12–15.5)
INR PPP: 1.18
KETONES UR STRIP-MCNC: NEGATIVE MG/DL
MCH RBC QN AUTO: 24.1 PG (ref 27–33.4)
MCHC RBC AUTO-ENTMCNC: 33.2 G/DL (ref 32–36)
MCV RBC AUTO: 73 FL (ref 80–97)
MONOCYTES % (MANUAL): 3 % (ref 3–13)
NEUTS BAND NFR BLD MANUAL: 6 % (ref 3–5)
PCO2 BLDV: 38 MMHG (ref 35–63)
PH BLDV: 7.44 [PH] (ref 7.3–7.42)
PH UR STRIP: 5 [PH] (ref 5–9)
PLATELET # BLD: 343 10^3/UL (ref 150–450)
PLATELET COMMENT: ADEQUATE
POTASSIUM SERPL-SCNC: 4.3 MMOL/L (ref 3.6–5)
PROT SERPL-MCNC: 7.1 G/DL (ref 6.3–8.2)
PROT UR STRIP-MCNC: 30 MG/DL
PROTHROMBIN TIME: 15 SEC (ref 11.4–15.4)
RBC # BLD AUTO: 4.77 10^6/UL (ref 3.72–5.28)
SEGMENTED NEUTROPHILS % (MAN): 83 % (ref 42–78)
SP GR UR STRIP: 1.02
TOTAL CELLS COUNTED BLD: 100
TOXIC GRANULES BLD QL SMEAR: SLIGHT
UROBILINOGEN UR-MCNC: 2 MG/DL (ref ?–2)
VARIANT LYMPHS NFR BLD MANUAL: 7 % (ref 13–45)
WBC # BLD AUTO: 29.2 10^3/UL (ref 4–10.5)

## 2020-04-24 PROCEDURE — 0JB90ZZ EXCISION OF BUTTOCK SUBCUTANEOUS TISSUE AND FASCIA, OPEN APPROACH: ICD-10-PCS | Performed by: SURGERY

## 2020-04-24 PROCEDURE — 5A09557 ASSISTANCE WITH RESPIRATORY VENTILATION, GREATER THAN 96 CONSECUTIVE HOURS, CONTINUOUS POSITIVE AIRWAY PRESSURE: ICD-10-PCS | Performed by: NURSE PRACTITIONER

## 2020-04-24 RX ADMIN — SODIUM CHLORIDE PRN MLS/HR: 9 INJECTION, SOLUTION INTRAVENOUS at 17:50

## 2020-04-24 RX ADMIN — ESCITALOPRAM OXALATE SCH MG: 10 TABLET, FILM COATED ORAL at 17:49

## 2020-04-24 RX ADMIN — VANCOMYCIN HYDROCHLORIDE SCH MLS/HR: 1 INJECTION, POWDER, LYOPHILIZED, FOR SOLUTION INTRAVENOUS at 21:48

## 2020-04-24 RX ADMIN — PIPERACILLIN AND TAZOBACTAM SCH: 3; .375 INJECTION, POWDER, LYOPHILIZED, FOR SOLUTION INTRAVENOUS; PARENTERAL at 16:41

## 2020-04-24 RX ADMIN — MORPHINE SULFATE PRN MG: 10 INJECTION INTRAMUSCULAR; INTRAVENOUS; SUBCUTANEOUS at 17:48

## 2020-04-24 RX ADMIN — DOCUSATE SODIUM SCH MG: 100 CAPSULE, LIQUID FILLED ORAL at 17:49

## 2020-04-24 RX ADMIN — PIPERACILLIN AND TAZOBACTAM SCH MLS/HR: 3; .375 INJECTION, POWDER, LYOPHILIZED, FOR SOLUTION INTRAVENOUS; PARENTERAL at 17:49

## 2020-04-24 RX ADMIN — HEPARIN SODIUM SCH UNIT: 5000 INJECTION, SOLUTION INTRAVENOUS; SUBCUTANEOUS at 21:48

## 2020-04-24 RX ADMIN — FAMOTIDINE SCH MG: 10 INJECTION INTRAVENOUS at 21:48

## 2020-04-24 NOTE — PDOC CONSULTATION
Consultation


Consult Date: 20


Attending physician:: CLAYTON VARGHESE IV


Provider Consulted: LOTUS HECK


Consult reason:: buttock abscess





History of Present Illness


Admission Date/PCP: 


  





  MARYCRUZ OSEGUERA PA-C





History of Present Illness: 


ELVIRA BARKER is a 34 year old female


This 34 year old female patient presents to the ED today with complaints of a 

right gluteal abscess and pain for the past x8 days. Patient states that she has

been trying to treat the abscess since onset, but notes that it is difficult for

her to keep the area clean. She reports that the abscess "popped on its own" and

that when her sister went to evaluate it, there was a "hole", so decided to come

to the ED. She notes that her weight was checked x1.5-2 months ago and that she 

weighs approximately x415 lbs.








Past Medical History


Cardiac Medical History: Reports: Hypertension


Pulmonary Medical History: Reports: Bronchitis


   Denies: Tuberculosis


Psychiatric Medical History: Reports: Depression





Past Surgical History


Past Surgical History: Reports:  Section - x1, Cholecystectomy


   Denies: Pacemaker





Social History


Smoking Status: Former Smoker


Hx Recreational Drug Use: No


Hx Prescription Drug Abuse: No





Family History


Family History: Reviewed & Not Pertinent


Parental Family History Reviewed: No


Children Family History Reviewed: NA


Sibling(s) Family History Reviewed.: NA





Medication/Allergy


Home Medications: 








Escitalopram Oxalate [Lexapro] 10 mg PO DAILY 11 


Valsartan [Diovan 80 mg Tablet] 160 mg PO DAILY 11 


Ibuprofen [Motrin 600 Mg Tablet] 600 mg PO TID #30 tablet 14 


Metformin HCl [Glucophage] 1,000 mg PO DAILY PRN 14 


Tramadol HCl [Ultram 50 mg Tablet] 50 mg PO ASDIR PRN #7 tablet 14 


Azithromycin [Zithromax 250 mg Tablet] 250 mg PO ASDIR PRN #6 tablet 14 


Polymyxin B Sulf/Trimethoprim [Polytrim Eye Drops] 1 drop OD Q3H #10 ml 06/27/15




Sulfamethoxazole/Trimethoprim [Bactrim Ds Tablet] 1 each PO BID #14 tablet 

17 


Albuterol Sulfate [Proair HFA Inhalation Aerosol 8.5 gm MDI] 2 puff IH Q4H PRN 

#1 mdi 17 


Clindamycin HCl [Cleocin 300 mg Capsule] 300 mg PO Q6 #28 capsule 06/10/18 


Prednisone 50 mg PO DAILY #5 tablet 06/10/18 


Lisinopril [Prinivil 10 mg Tablet] 10 mg PO DAILY #15 tablet 19 


Clotrimazole/Betamethasone Dip [Lotrisone Cream 15 gm] 1 applic TP BID #1 tube 

10/14/19 


Fluconazole [Diflucan] 150 mg PO ONCE PRN #1 tablet 10/14/19 


Hydroxyzine HCl [Atarax 50 mg Tablet] 50 mg PO PRN PRN #14 tablet 10/14/19 








Allergies/Adverse Reactions: 


                                        





No Known Allergies Allergy (Verified 20 22:32)


   











Review of Systems


Constitutional: PRESENT: fatigue, fever(s)


Eyes: ABSENT: as per HPI, visual disturbances, other


Ears: ABSENT: as per HPI, hearing changes, other


Nose, Mouth, and Throat: ABSENT: as per HPI, headache(s), mouth pain, sore 

throat, vertigo, other


Breasts: ABSENT: as per HPI, other


Cardiovascular: ABSENT: as per HPI, chest pain, dyspnea on exertion, edema, 

orthropnea, palpitations, other


Respiratory: ABSENT: as per HPI, cough, dyspnea, hemoptysis, sputum, other


Gastrointestinal: ABSENT: as per HPI, abdominal pain, bloating, coffee ground 

emesis, constipation, diarrhea, dysphagia, heartburn, hematemesis, hematochezia,

melena, nausea, vomiting, other


Integumentary: ABSENT: as per HPI, diaphoresis, erythema, lesions, pruritus, 

rash, wounds, other


Neurological: ABSENT: as per HPI, abnormal gait, abnormal movements, abnormal 

speech, confusion, convulsions, dizziness, focal weakness, frequent falls, lack 

of coordination, memory loss, numbness, paresthesias, restless legs, syncope, 

tingling, tremor(s), vertigo, weakness, other


Endocrine: ABSENT: as per HPI, cold intolerance, flushing, heat intolerance, 

menstrual abnormalities, polydipsia, polyphagia, polyuria, other


Hematologic/Lymphatic: ABSENT: as per HPI, easy bleeding, easy bruising, 

lymphadenopathy, other


Allergic/Immunologic: ABSENT: as per HPI, seasonal rhinorrhea, other





Physical Exam


Vital Signs: 


                                        











Temp Pulse Resp BP Pulse Ox


 


 101.7 F H  133 H  16   130/88 H  99 


 


 20 06:14  20 22:28  20 04:00  20 03:56  20 04:00








                                 Intake & Output











 20





 06:59 06:59 06:59


 


Weight  188.6 kg 











General appearance: PRESENT: morbidly obese


Head exam: PRESENT: normocephalic


Eye exam: PRESENT: EOMI


Ear exam: PRESENT: normal external ear exam


Mouth exam: PRESENT: dry mucosa


Teeth exam: PRESENT: poor dentation


Neck exam: PRESENT: full ROM


Respiratory exam: PRESENT: clear to auscultation viridiana


Cardiovascular exam: PRESENT: RRR


Pulses: PRESENT: normal radial pulses, normal femoral pulses


Breast: PRESENT: Normal


GI/Abdominal exam: PRESENT: soft


Rectal exam: PRESENT: other - During of the skin over the entire area firmness 

and drainage of foul-smelling dark dishwater colored purulent fluid.


Extremities exam: PRESENT: full ROM


Neurological exam: PRESENT: alert, awake, oriented to person, oriented to place


Psychiatric exam: PRESENT: appropriate affect


Skin exam: PRESENT: dry





Results


Laboratory Results: 


                                        





                                 20 02:06 





                                 20 02:06 





                                        











  20





  02:06 02:06 02:06


 


WBC  29.2 H  


 


RBC  4.77  


 


Hgb  11.5 L  


 


Hct  34.7 L  


 


MCV  73 L  


 


MCH  24.1 L  


 


MCHC  33.2  


 


RDW  16.9 H  


 


Plt Count  343  


 


Seg Neutrophils %  Not Reportable  


 


VBG pH   


 


VBG pCO2   


 


VBG HCO3   


 


VBG Base Excess   


 


Sodium   133.3 L 


 


Potassium   4.3 


 


Chloride   100 


 


Carbon Dioxide   25 


 


Anion Gap   8 


 


BUN   17 


 


Creatinine   1.17 


 


Est GFR ( Amer)   > 60 


 


Glucose   156 H 


 


Lactic Acid    1.6


 


Calcium   8.4 


 


Total Bilirubin   0.6 


 


AST   58 H 


 


Alkaline Phosphatase   156 H 


 


Total Protein   7.1 


 


Albumin   3.1 L 


 


Serum HCG, Qual   


 


Urine Color   


 


Urine Appearance   


 


Urine pH   


 


Ur Specific Gravity   


 


Urine Protein   


 


Urine Glucose (UA)   


 


Urine Ketones   


 


Urine Blood   














  20





  02:06 02:43 03:12


 


WBC   


 


RBC   


 


Hgb   


 


Hct   


 


MCV   


 


MCH   


 


MCHC   


 


RDW   


 


Plt Count   


 


Seg Neutrophils %   


 


VBG pH   7.44 H 


 


VBG pCO2   38.0 


 


VBG HCO3   25.5 


 


VBG Base Excess   1.5 


 


Sodium   


 


Potassium   


 


Chloride   


 


Carbon Dioxide   


 


Anion Gap   


 


BUN   


 


Creatinine   


 


Est GFR (African Amer)   


 


Glucose   


 


Lactic Acid   


 


Calcium   


 


Total Bilirubin   


 


AST   


 


Alkaline Phosphatase   


 


Total Protein   


 


Albumin   


 


Serum HCG, Qual  NEGATIVE  


 


Urine Color    RAJINDER


 


Urine Appearance    TURBID


 


Urine pH    5.0


 


Ur Specific Gravity    1.021


 


Urine Protein    30 H


 


Urine Glucose (UA)    NEGATIVE


 


Urine Ketones    NEGATIVE


 


Urine Blood    SMALL H














  20





  06:16


 


WBC 


 


RBC 


 


Hgb 


 


Hct 


 


MCV 


 


MCH 


 


MCHC 


 


RDW 


 


Plt Count 


 


Seg Neutrophils % 


 


VBG pH 


 


VBG pCO2 


 


VBG HCO3 


 


VBG Base Excess 


 


Sodium 


 


Potassium 


 


Chloride 


 


Carbon Dioxide 


 


Anion Gap 


 


BUN 


 


Creatinine 


 


Est GFR (African Amer) 


 


Glucose 


 


Lactic Acid  1.9


 


Calcium 


 


Total Bilirubin 


 


AST 


 


Alkaline Phosphatase 


 


Total Protein 


 


Albumin 


 


Serum HCG, Qual 


 


Urine Color 


 


Urine Appearance 


 


Urine pH 


 


Ur Specific Gravity 


 


Urine Protein 


 


Urine Glucose (UA) 


 


Urine Ketones 


 


Urine Blood 











Impressions: 


                                        





Extremity Ultrasound  20 05:34


IMPRESSION:


 


Moderate soft tissue edema/cellulitis of the right gluteal


region. Limitation.


 














Assessment & Plan





- Plan Summary


Plan Summary: 





Impression is a large perirectal buttock abscess with necrosis and skin 

blistering consistent with a early necrotizing infection of the inferior medial 

buttock rochelle anal area.





Consistent with a possible necrotizing fasciitis of the right inferior medial 

buttock.





Explained to the patient if this is a serious soft tissue infection and it could

extend into her rectum which would require extensive debridement of the rectum 

and possible colostomy.





We will plan on operative debridement IV antibiotics and IV fluids.





The risks and benefits of the procedure which include bleeding infection 

recurrence pulmonary embolism stroke myocardial infarction and death been 

discussed with the patient





She understands she may need repeat surgeries and further debridement and a 

possible colostomy





She understands and agrees to proceed.

## 2020-04-24 NOTE — ER DOCUMENT REPORT
Entered by SELVIN PETERS SCRIBE  20 0136 





Acting as scribe for:MAXWELL SMILEY IV, MD





ED Skin Rash/Insect Bite/Abscs





- General


Mode of Arrival: Ambulatory


Information source: Patient


TRAVEL OUTSIDE OF THE [place] IN LAST 30 DAYS: No





- Related Data


Home Medications: HTN





<MAXWELL SMILEY IV - Last Filed: 20 05:57>





<PANCHO RHODES - Last Filed: 20 08:21>





- General


Chief Complaint: Abscess


Stated Complaint: BUTT CHEEK PAIN


Time Seen by Provider: 20 01:33


Primary Care Provider: 


MARYCRUZ OSEGUERA PA-C [Primary Care Provider] - Follow up as needed


Notes: 





This 34 year old female patient presents to the ED today with complaints of a 

right gluteal abscess and pain for the past x8 days. Patient states that she has

been trying to treat the abscess since onset, but notes that it is difficult for

her to keep the area clean. She reports that the abscess "popped on its own" and

that when her sister went to evaluate it, there was a "hole", so decided to come

to the ED. She notes that her weight was checked x1.5-2 months ago and that she 

weighs approximately x415 lbs. (MAXWELL SMILEY IV)





- Related Data


Allergies/Adverse Reactions: 


                                        





No Known Allergies Allergy (Verified 20 22:32)


   











Past Medical History





- General


Information source: Patient, [facility] Records





- Social History


Smoking Status: Former Smoker


Cigarette use (# per day): No


Chew tobacco use (# tins/day): No


Smoking Education Provided: No


Family History: Reviewed & Not Pertinent


Patient has suicidal ideation: No


Patient has homicidal ideation: No





- Past Medical History


Cardiac Medical History: Reports: Hx Hypertension


Pulmonary Medical History: Reports: Hx Bronchitis


Psychiatric Medical History: Reports: Hx Depression


Past Surgical History: Reports: Hx  Section - x1, Hx Cholecystectomy





- Immunizations


Hx Diphtheria, Pertussis, Tetanus Vaccination: Yes


Hx Pneumococcal Vaccination: 12





<MAXWELL SMILEY IV - Last Filed: 20 05:57>





Review of Systems





- Review of Systems


Constitutional: No symptoms reported


EENT: No symptoms reported


Cardiovascular: No symptoms reported


Respiratory: No symptoms reported


Gastrointestinal: No symptoms reported


Genitourinary: No symptoms reported


Female Genitourinary: No symptoms reported


Musculoskeletal: See HPI, Other - Gluteal pain


Skin: See HPI, Other - Abscess


Hematologic/Lymphatic: No symptoms reported


Neurological/Psychological: No symptoms reported


-: Yes All other systems reviewed and negative





<MAXWELL SMILEY IV - Last Filed: 20 05:57>





Physical Exam





- General


General appearance: Alert





- HEENT


Head: Normocephalic, Atraumatic


Eyes: Normal


Pupils: PERRL





- Respiratory


Respiratory status: No respiratory distress


Chest status: Nontender


Breath sounds: Normal


Chest palpation: Normal





- Cardiovascular


Rhythm: Regular, Tachycardia


Heart sounds: Normal auscultation


Murmur: No


Friction rub: No


Gallop: None auscultated





- Abdominal


Inspection: Morbidly Obese


Distension: No distension


Bowel sounds: Normal


Tenderness: Nontender - Abdomen soft


Organomegaly: No organomegaly





- Back


Back: Normal, Nontender





- Extremities


General upper extremity: Normal inspection


General lower extremity: Normal inspection





- Neurological


Neuro grossly intact: Yes





- Psychological


Associated symptoms: Normal affect, Normal mood





- Skin


Skin Temperature: Warm


Skin Moisture: Dry


Skin Color: Normal


Skin irregularity: Lesion - 1 cm lesion noted to right gluteus that is adjacent 

to mid gluteal crease. Foul-smelling purulent drainage appreciated., other - 

Right gluteus is significant for erythema, induration, tenderness to palpation 

and warm to the touch.





<MAXWELL SMILEY IV - Last Filed: 20 05:57>





- Vital signs


Vitals: 





                                        











Temp Pulse Resp BP Pulse Ox


 


 98.5 F   133 H  26 H  147/86 H  100 


 


 20 22:28  20 22:28  20 22:28  20 22:28  20 22:28














Course





- Laboratory


Result Diagrams: 


                                 20 02:06





                                 20 02:06





- Transfer of Care


Care transferred to following provider: DR. RHODES AT 0600





<MAXWELL SMILEY IV - Last Filed: 20 05:57>





- Laboratory


Result Diagrams: 


                                 20 02:06





                                 20 02:06





<PANCHO RHODES - Last Filed: 20 08:21>





- Re-evaluation


Re-evalutation: 





20 08:15


At 0600 hrs. I assumed care of this patient who had been initially worked up by 

Dr. Maxwell Smiley.  She had an ultrasound of the buttock area that did not 

show an obvious drainable abscess.  Clinically this looks like possible 

necrotizing fasciitis.  Discussed with Farhana Jara from the hospitalist 

service and they will admit.  Dr. Smith from general surgery has seen the 

patient and plans to take her to the OR for debridement.  She is being treated 

for sepsis at this time.  I note that her transaminases are elevated.  Lactate 

was 1.9.  White count is 29,000 she has a temperature of 101.5 and is persi

stently tachycardic.  Blood cultures been drawn and she is gotten 30 cc/kg of 

crystalloids along with Zosyn and vancomycin IV. (PANCHO RHODES)





- Vital Signs


Vital signs: 





                                        











Temp Pulse Resp BP Pulse Ox


 


 101.7 F H  133 H  16   130/88 H  99 


 


 20 06:14  20 22:28  20 04:00  20 03:56  20 04:00














- Laboratory


Laboratory results interpreted by me: 





                                        











  20





  02:06 02:06 02:43


 


WBC  29.2 H  


 


Hgb  11.5 L  


 


Hct  34.7 L  


 


MCV  73 L  


 


MCH  24.1 L  


 


RDW  16.9 H  


 


Seg Neuts % (Manual)  83 H  


 


Band Neutrophils %  6 H  


 


Lymphocytes % (Manual)  7 L  


 


Abs Neuts (Manual)  26.0 H  


 


VBG pH    7.44 H


 


Sodium   133.3 L 


 


Est GFR (MDRD) Non-Af   52 L 


 


Glucose   156 H 


 


AST   58 H 


 


ALT   38 H 


 


Alkaline Phosphatase   156 H 


 


Albumin   3.1 L 


 


Urine Protein   


 


Urine Blood   


 


Urine Urobilinogen   














  20





  03:12


 


WBC 


 


Hgb 


 


Hct 


 


MCV 


 


MCH 


 


RDW 


 


Seg Neuts % (Manual) 


 


Band Neutrophils % 


 


Lymphocytes % (Manual) 


 


Abs Neuts (Manual) 


 


VBG pH 


 


Sodium 


 


Est GFR (MDRD) Non-Af 


 


Glucose 


 


AST 


 


ALT 


 


Alkaline Phosphatase 


 


Albumin 


 


Urine Protein  30 H


 


Urine Blood  SMALL H


 


Urine Urobilinogen  2.0 H














Discharge





<MAXWELL SMILEY IV - Last Filed: 20 05:57>





- Discharge


Admitting Provider: Marek/Robe


Unit Admitted: Medical Floor





<PANCHO RHODES E - Last Filed: 20 08:21>





- Discharge


Clinical Impression: 


 Soft tissue infection





Leukocytosis


Qualifiers:


 Leukocytosis type: unspecified Qualified Code(s): D72.829 - Elevated white 

blood cell count, unspecified





Condition: Serious


Disposition: ADMITTED AS INPATIENT


Referrals: 


MARYCRUZ OSEGUERA PA-C [Primary Care Provider] - Follow up as needed





I personally performed the services described in the documentation, reviewed and

edited the documentation which was dictated to the scribe in my presence, and it

accurately records my words and actions.

## 2020-04-24 NOTE — PDOC H&P
History of Present Illness


Admission Date/PCP: 


  20 09:41





  MARYCRUZ OSEGUERA PA-C





Patient complains of: buttocks pain


History of Present Illness: 


ELVIRA BARKER is a 34 year old female with a past medical history of 

hypertension, depression, and morbid obesity who presented to the emergency 

department today with complaints of right buttocks pain.


Evaluation emergency department revealed normal temperature, tachycardia 133, 

acceptable blood pressure, tachypnea, 100% on room air.  Leukocytosis (WBCs 29.2

with a left-sided shift), mild anemia, Chemistry revealing elevated glucose 156 

mildly elevated LFTs, and a negative urinalysis.  Ultrasound revealed soft t

issue erythema/cellulitis to the right gluteal region.  Patient body habitus 

prevents CT scanning.


She is referred to the hospital service for admission and management of the 

above-stated complaints and findings with surgery consulted.





Past Medical History


Cardiac Medical History: Reports: Hypertension


Pulmonary Medical History: Reports: Bronchitis


   Denies: Tuberculosis


EENT Medical History: Reports: None


Neurological Medical History: Reports: None


Endocrine Medical History: Reports: Obesity


Renal/ Medical History: Reports: None


Malignancy Medical History: Reports: None


GI Medical History: Reports: None


Musculoskeltal Medical History: Reports: None


Skin Medical History: Reports: None


Psychiatric Medical History: Reports: Depression


Traumatic Medical History: Reports: None


Hematology: Reports: None


Infectious Medical History: Reports: None





Past Surgical History


Past Surgical History: Reports:  Section - x1, Cholecystectomy


   Denies: Pacemaker





Social History


Information Source: Patient


Lives with: Family


Smoking Status: Former Smoker


Electronic Cigarette use?: No


Last Time Smoked: month ago


Frequency of Alcohol Use: Occasional


Hx Recreational Drug Use: No


Hx Prescription Drug Abuse: No





- Advance Directive


Resuscitation Status: Full Code





Family History


Family History: Reviewed & Not Pertinent


Parental Family History Reviewed: Yes


Children Family History Reviewed: Yes


Sibling(s) Family History Reviewed.: Yes





Medication/Allergy


Home Medications: 








Escitalopram Oxalate [Lexapro] 10 mg PO DAILY 11 


Ibuprofen [Motrin 600 Mg Tablet] 600 mg PO TID #30 tablet 14 


Lisinopril [Prinivil 10 mg Tablet] 10 mg PO DAILY #15 tablet 19 


Albuterol Sulfate [Proair HFA Inhalation Aerosol 8.5 gm MDI] 2 puff IH Q4HP PRN 

20 








Allergies/Adverse Reactions: 


                                        





No Known Allergies Allergy (Verified 20 22:32)


   











Review of Systems


Constitutional: ABSENT: chills, fever(s), headache(s), weight gain, weight loss


Eyes: ABSENT: visual disturbances


Ears: ABSENT: hearing changes


Cardiovascular: ABSENT: chest pain, dyspnea on exertion, edema, orthropnea, 

palpitations


Respiratory: ABSENT: cough, hemoptysis


Gastrointestinal: ABSENT: abdominal pain, constipation, diarrhea, hematemesis, 

hematochezia, nausea, vomiting


Genitourinary: ABSENT: dysuria, hematuria


Musculoskeletal: ABSENT: joint swelling


Integumentary: PRESENT: as per HPI, wounds.  ABSENT: rash


Neurological: ABSENT: abnormal gait, abnormal speech, confusion, dizziness, 

focal weakness, syncope


Psychiatric: ABSENT: anxiety, depression, homidical ideation, suicidal ideation


Endocrine: ABSENT: cold intolerance, heat intolerance, polydipsia, polyuria


Hematologic/Lymphatic: ABSENT: easy bleeding, easy bruising





Physical Exam


Vital Signs: 


                                        











Temp Pulse Resp BP Pulse Ox


 


 98.4 F   105 H  20   130/70 H  96 


 


 20 15:14  20 15:14  20 15:14  20 15:14  20 15:50








                                 Intake & Output











 20





 06:59 06:59 06:59


 


Intake Total   6560


 


Output Total   0


 


Balance   6560


 


Weight  188.6 kg 











General appearance: PRESENT: no acute distress, cooperative, morbidly obese, 

well-developed, well-nourished


Head exam: PRESENT: atraumatic, normocephalic


Eye exam: PRESENT: conjunctiva pink, EOMI, PERRLA.  ABSENT: scleral icterus


Mouth exam: PRESENT: moist, tongue midline


Respiratory exam: PRESENT: clear to auscultation viridiana, decreased breath sounds - 

Diminished throughout; secondary to body habitus, symmetrical, unlabored, other 

- supplemental oxygen via NC.  ABSENT: rales, rhonchi, wheezes


Cardiovascular exam: PRESENT: RRR.  ABSENT: diastolic murmur, rubs, systolic 

murmur


Vascular exam: PRESENT: normal capillary refill


GI/Abdominal exam: PRESENT: normal bowel sounds, soft.  ABSENT: distended, 

guarding, rebound, tenderness


Rectal exam: PRESENT: deferred


Extremities exam: PRESENT: full ROM.  ABSENT: calf tenderness, clubbing, pedal 

edema


Neurological exam: PRESENT: alert, awake, oriented to person, oriented to place,

oriented to time, oriented to situation, CN II-XII grossly intact.  ABSENT: 

motor sensory deficit


Psychiatric exam: PRESENT: appropriate affect, normal mood.  ABSENT: homicidal 

ideation, suicidal ideation


Skin exam: PRESENT: dry, warm.  ABSENT: cyanosis, intact - Wound to right 

buttocks with surgical dressing in place, rash





Results


Laboratory Results: 


                                        





                                 20 02:06 





                                 20 02:06 





                                        











  20





  02:06 02:06 02:06


 


WBC  29.2 H  


 


RBC  4.77  


 


Hgb  11.5 L  


 


Hct  34.7 L  


 


MCV  73 L  


 


MCH  24.1 L  


 


MCHC  33.2  


 


RDW  16.9 H  


 


Plt Count  343  


 


Seg Neutrophils %  Not Reportable  


 


VBG pH   


 


VBG pCO2   


 


VBG HCO3   


 


VBG Base Excess   


 


Sodium   133.3 L 


 


Potassium   4.3 


 


Chloride   100 


 


Carbon Dioxide   25 


 


Anion Gap   8 


 


BUN   17 


 


Creatinine   1.17 


 


Est GFR ( Amer)   > 60 


 


Glucose   156 H 


 


Lactic Acid    1.6


 


Calcium   8.4 


 


Total Bilirubin   0.6 


 


AST   58 H 


 


Alkaline Phosphatase   156 H 


 


Total Protein   7.1 


 


Albumin   3.1 L 


 


Serum HCG, Qual   


 


Urine Color   


 


Urine Appearance   


 


Urine pH   


 


Ur Specific Gravity   


 


Urine Protein   


 


Urine Glucose (UA)   


 


Urine Ketones   


 


Urine Blood   














  20





  02:06 02:43 03:12


 


WBC   


 


RBC   


 


Hgb   


 


Hct   


 


MCV   


 


MCH   


 


MCHC   


 


RDW   


 


Plt Count   


 


Seg Neutrophils %   


 


VBG pH   7.44 H 


 


VBG pCO2   38.0 


 


VBG HCO3   25.5 


 


VBG Base Excess   1.5 


 


Sodium   


 


Potassium   


 


Chloride   


 


Carbon Dioxide   


 


Anion Gap   


 


BUN   


 


Creatinine   


 


Est GFR (African Amer)   


 


Glucose   


 


Lactic Acid   


 


Calcium   


 


Total Bilirubin   


 


AST   


 


Alkaline Phosphatase   


 


Total Protein   


 


Albumin   


 


Serum HCG, Qual  NEGATIVE  


 


Urine Color    RAJINDER


 


Urine Appearance    TURBID


 


Urine pH    5.0


 


Ur Specific Gravity    1.021


 


Urine Protein    30 H


 


Urine Glucose (UA)    NEGATIVE


 


Urine Ketones    NEGATIVE


 


Urine Blood    SMALL H














  20





  06:16 09:09


 


WBC  


 


RBC  


 


Hgb  


 


Hct  


 


MCV  


 


MCH  


 


MCHC  


 


RDW  


 


Plt Count  


 


Seg Neutrophils %  


 


VBG pH  


 


VBG pCO2  


 


VBG HCO3  


 


VBG Base Excess  


 


Sodium  


 


Potassium  


 


Chloride  


 


Carbon Dioxide  


 


Anion Gap  


 


BUN  


 


Creatinine  


 


Est GFR (African Amer)  


 


Glucose  


 


Lactic Acid  1.9  1.2


 


Calcium  


 


Total Bilirubin  


 


AST  


 


Alkaline Phosphatase  


 


Total Protein  


 


Albumin  


 


Serum HCG, Qual  


 


Urine Color  


 


Urine Appearance  


 


Urine pH  


 


Ur Specific Gravity  


 


Urine Protein  


 


Urine Glucose (UA)  


 


Urine Ketones  


 


Urine Blood  








                                        





20 14:00   Buttocks - Abscess   Gram Stain - Final








Impressions: 


                                        





Extremity Ultrasound  20 05:34


IMPRESSION:


 


Moderate soft tissue edema/cellulitis of the right gluteal


region. Limitation.


 














Assessment and Plan





- Diagnosis


(1) Perirectal abscess


Is this a current diagnosis for this admission?: Yes   


Plan: 


Now status post surgical I&D by Dr. Smith.


Wound and blood cultures are pending.


Surgery is consulted; primary plan per their expertise.


Patient is admitted to the medical floor.


She has been empirically placed on IV vancomycin and Zosyn.


Antiemetics and analgesics as needed.








(2) Morbid obesity with BMI of 60.0-69.9, adult


Is this a current diagnosis for this admission?: Yes   


Plan: 


BMI 69.2.


The patient's super morbid obesity requires additional nursing support.  


Bariatric bed.


She is at risk for further wound breakdown and/or respiratory complications.


Lifestyle modification dietary discretion are strongly advised.


Will check TSH, A1c, and lipid panel with a.m. lab work.


Registered dietitian is consulted.








(3) Hypertension


Is this a current diagnosis for this admission?: Yes   


Plan: 


Continue home dose Lisinopril.


IV hydralazine as needed for blood pressure control.


Appropriate pain control.


Cardiac diet.








(4) Depression


Is this a current diagnosis for this admission?: Yes   


Plan: 


Continue home dose Lexapro.








- Time


Time Spent with patient: 35 or more minutes


Medications reviewed and adjusted accordingly: Yes


Anticipated discharge: SNF - vs LTAC for wound care, Other

## 2020-04-24 NOTE — OPERATIVE REPORT
Nonrecallable Operative Report


DATE OF SURGERY: 04/24/20


PREOPERATIVE DIAGNOSIS: Perianal buttock abscess right buttock


POSTOPERATIVE DIAGNOSIS: Renal buttock abscess right buttock


OPERATION: Wide excisional debridement of right buttock and rochelle-anal abscess


SURGEON: LOTUS HECK


1ST ASSISTANT: MARINA MATTHEW


ANESTHESIA: GA


TISSUE REMOVED OR ALTERED: Buttock skin and fat tissue


COMPLICATIONS: 





None


ESTIMATED BLOOD LOSS: 200 cc


INTRAOPERATIVE FINDINGS: See dictation


PROCEDURE: 





Patient was initially brought to the operating room awake alert and placed on 

the operative table and attempted spinal anesthetic was done after multiple 

attempts by anesthesia they were unable to place a spinal anesthetic and 

therefore the patient was wheeled out of the room and purred COVID-19 protocol 

was placed in a negative pressure room and induced under general anesthesia she 

was then brought back to the operating room.





She was placed on the operating table in a left lateral decubitus position with 

the right buttock up.  The buttock and anus were prepped and draped in usual 

sterile manner for the procedure.  Patient had a very large necrosing abscess in

her right buttock that extended down from the mid buttock to the buttock cleft 

and then inferior to the rochelle-rectal area.  We made a large elliptical incision 

around the abscess approximately 8 cm wide by 25 cm long she was morbidly obese 

and had a quite a large buttock.  We carried our dissection down through s

ubcutaneous tissue to the fatty tissue noting area of dishwater type fluid very 

foul-smelling necrotic tissue all the way to the gluteus muscle.  We excised the

fat and necrotic tissue up off the gluteus muscle and continue that inferiorly 

to almost reaching the rochelle-rectum.  I did not breach the wall of the rectum as 

a had one finger in the rectum and was able to palpated in the dissecting field.

 Once we removed all the necrotic foul-smelling fatty tissue we got the tissue 

back down to clean bleeding fat or muscle of the gluteus debora.  We then 

copiously irrigated the wound with normal saline suctioned dry.  Packed with a 

Betadine soaked sponge.  Sterile dressing was applied which completed the 

procedure.





Estimated blood loss is 200 cc sponge needle counts were correct x2.  She was th

en transferred back to the recovery room intubated for extubation in the 

negative pressure room.





CHRISTOPH Marie was present for the entire case for help with wound 

retraction wound closure

## 2020-04-24 NOTE — RADIOLOGY REPORT (SQ)
EXAM DESCRIPTION:

US EXTREMITY MUSCULOSKELETAL LIMITED



COMPLETED DATE/TME:  04/24/2020 05:34



CLINICAL HISTORY:

34 years Female, ? ABSCESS RIGHT BUTTOCK



Comparison: None.



LIMITATIONS: Body habitus.



FINDINGS:



Moderate subcutaneous and deep soft tissue edema in the right

gluteal region corresponding to an area of symptomatology. The

largest fluid pocket measures up to 0.9 x 0.3 x 0.3 cm. No

encapsulated fluid. No abscess.



IMPRESSION:



Moderate soft tissue edema/cellulitis of the right gluteal

region. Limitation.

## 2020-04-25 LAB
ABSOLUTE LYMPHOCYTES# (MANUAL): 1.1 10^3/UL (ref 0.5–4.7)
ABSOLUTE MONOCYTES # (MANUAL): 1.9 10^3/UL (ref 0.1–1.4)
ADD MANUAL DIFF: YES
ANION GAP SERPL CALC-SCNC: 5 MMOL/L (ref 5–19)
ANISOCYTOSIS BLD QL SMEAR: (no result)
BASOPHILS NFR BLD MANUAL: 0 % (ref 0–2)
BUN SERPL-MCNC: 12 MG/DL (ref 7–20)
CALCIUM: 7.6 MG/DL (ref 8.4–10.2)
CHLORIDE SERPL-SCNC: 106 MMOL/L (ref 98–107)
CHOLEST SERPL-MCNC: 86.4 MG/DL (ref 0–200)
CO2 SERPL-SCNC: 24 MMOL/L (ref 22–30)
EOSINOPHIL NFR BLD MANUAL: 0 % (ref 0–6)
ERYTHROCYTE [DISTWIDTH] IN BLOOD BY AUTOMATED COUNT: 17 % (ref 11.5–14)
GLUCOSE SERPL-MCNC: 128 MG/DL (ref 75–110)
HCT VFR BLD CALC: 28.5 % (ref 36–47)
HGB BLD-MCNC: 9.3 G/DL (ref 12–15.5)
LDLC SERPL DIRECT ASSAY-MCNC: 52 MG/DL (ref ?–100)
MCH RBC QN AUTO: 23.8 PG (ref 27–33.4)
MCHC RBC AUTO-ENTMCNC: 32.6 G/DL (ref 32–36)
MCV RBC AUTO: 73 FL (ref 80–97)
MONOCYTES % (MANUAL): 7 % (ref 3–13)
OVALOCYTES BLD QL SMEAR: (no result)
PLATELET # BLD: 299 10^3/UL (ref 150–450)
PLATELET COMMENT: ADEQUATE
POIKILOCYTOSIS BLD QL SMEAR: (no result)
POTASSIUM SERPL-SCNC: 4.5 MMOL/L (ref 3.6–5)
RBC # BLD AUTO: 3.9 10^6/UL (ref 3.72–5.28)
SEGMENTED NEUTROPHILS % (MAN): 89 % (ref 42–78)
TARGETS BLD QL SMEAR: SLIGHT
TOTAL CELLS COUNTED BLD: 100
TOXIC GRANULES BLD QL SMEAR: (no result)
TRIGL SERPL-MCNC: 144 MG/DL (ref ?–150)
VANCOMYCIN,TROUGH: 9 UG/ML (ref 5–20)
VARIANT LYMPHS NFR BLD MANUAL: 4 % (ref 13–45)
VLDLC SERPL CALC-MCNC: 29 MG/DL (ref 10–31)
WBC # BLD AUTO: 27.1 10^3/UL (ref 4–10.5)

## 2020-04-25 RX ADMIN — HEPARIN SODIUM SCH UNIT: 5000 INJECTION, SOLUTION INTRAVENOUS; SUBCUTANEOUS at 14:20

## 2020-04-25 RX ADMIN — INSULIN LISPRO SCH UNIT: 100 INJECTION, SOLUTION INTRAVENOUS; SUBCUTANEOUS at 17:16

## 2020-04-25 RX ADMIN — PIPERACILLIN AND TAZOBACTAM SCH MLS/HR: 3; .375 INJECTION, POWDER, LYOPHILIZED, FOR SOLUTION INTRAVENOUS; PARENTERAL at 06:07

## 2020-04-25 RX ADMIN — IBUPROFEN SCH MG: 800 TABLET, FILM COATED ORAL at 17:16

## 2020-04-25 RX ADMIN — FAMOTIDINE SCH MG: 10 INJECTION INTRAVENOUS at 21:34

## 2020-04-25 RX ADMIN — FAMOTIDINE SCH MG: 10 INJECTION INTRAVENOUS at 09:40

## 2020-04-25 RX ADMIN — VANCOMYCIN HYDROCHLORIDE SCH MLS/HR: 1 INJECTION, POWDER, LYOPHILIZED, FOR SOLUTION INTRAVENOUS at 21:34

## 2020-04-25 RX ADMIN — VANCOMYCIN HYDROCHLORIDE SCH MLS/HR: 1 INJECTION, POWDER, LYOPHILIZED, FOR SOLUTION INTRAVENOUS at 06:08

## 2020-04-25 RX ADMIN — PIPERACILLIN AND TAZOBACTAM SCH MLS/HR: 3; .375 INJECTION, POWDER, LYOPHILIZED, FOR SOLUTION INTRAVENOUS; PARENTERAL at 00:27

## 2020-04-25 RX ADMIN — MORPHINE SULFATE PRN MG: 10 INJECTION INTRAMUSCULAR; INTRAVENOUS; SUBCUTANEOUS at 20:18

## 2020-04-25 RX ADMIN — INSULIN LISPRO SCH: 100 INJECTION, SOLUTION INTRAVENOUS; SUBCUTANEOUS at 21:38

## 2020-04-25 RX ADMIN — DOCUSATE SODIUM SCH MG: 100 CAPSULE, LIQUID FILLED ORAL at 09:41

## 2020-04-25 RX ADMIN — MORPHINE SULFATE PRN MG: 10 INJECTION INTRAMUSCULAR; INTRAVENOUS; SUBCUTANEOUS at 05:59

## 2020-04-25 RX ADMIN — PIPERACILLIN AND TAZOBACTAM SCH MLS/HR: 3; .375 INJECTION, POWDER, LYOPHILIZED, FOR SOLUTION INTRAVENOUS; PARENTERAL at 11:55

## 2020-04-25 RX ADMIN — PIPERACILLIN AND TAZOBACTAM SCH MLS/HR: 3; .375 INJECTION, POWDER, LYOPHILIZED, FOR SOLUTION INTRAVENOUS; PARENTERAL at 23:51

## 2020-04-25 RX ADMIN — ESCITALOPRAM OXALATE SCH MG: 10 TABLET, FILM COATED ORAL at 09:41

## 2020-04-25 RX ADMIN — SODIUM CHLORIDE PRN MLS/HR: 9 INJECTION, SOLUTION INTRAVENOUS at 11:56

## 2020-04-25 RX ADMIN — MORPHINE SULFATE PRN MG: 10 INJECTION INTRAMUSCULAR; INTRAVENOUS; SUBCUTANEOUS at 00:28

## 2020-04-25 RX ADMIN — IBUPROFEN SCH MG: 800 TABLET, FILM COATED ORAL at 11:55

## 2020-04-25 RX ADMIN — VANCOMYCIN HYDROCHLORIDE SCH MLS/HR: 1 INJECTION, POWDER, LYOPHILIZED, FOR SOLUTION INTRAVENOUS at 14:20

## 2020-04-25 RX ADMIN — LISINOPRIL SCH MG: 10 TABLET ORAL at 09:41

## 2020-04-25 RX ADMIN — DOCUSATE SODIUM SCH MG: 100 CAPSULE, LIQUID FILLED ORAL at 17:16

## 2020-04-25 RX ADMIN — HEPARIN SODIUM SCH UNIT: 5000 INJECTION, SOLUTION INTRAVENOUS; SUBCUTANEOUS at 05:59

## 2020-04-25 RX ADMIN — HEPARIN SODIUM SCH UNIT: 5000 INJECTION, SOLUTION INTRAVENOUS; SUBCUTANEOUS at 21:34

## 2020-04-25 RX ADMIN — PIPERACILLIN AND TAZOBACTAM SCH MLS/HR: 3; .375 INJECTION, POWDER, LYOPHILIZED, FOR SOLUTION INTRAVENOUS; PARENTERAL at 17:17

## 2020-04-25 NOTE — PDOC PROGRESS REPORT
Subjective


Progress Note for:: 04/25/20


Reason For Visit: 


SEPSIS,BUTTOCKS ABSCESS








Physical Exam


Vital Signs: 


                                        











Temp Pulse Resp BP Pulse Ox


 


 99.4 F   104 H  18   111/62   97 


 


 04/25/20 08:19  04/25/20 08:19  04/25/20 08:19  04/25/20 08:19  04/25/20 08:19








                                 Intake & Output











 04/24/20 04/25/20 04/26/20





 06:59 06:59 06:59


 


Intake Total  7792 


 


Output Total  90 


 


Balance  7702 


 


Weight 188.6 kg 213.1 kg 














Results


Laboratory Results: 


                                        





                                 04/25/20 05:20 





                                 04/25/20 05:20 





                                        











  04/24/20 04/25/20 04/25/20





  09:09 05:20 05:20


 


WBC   27.1 H 


 


RBC   3.90 


 


Hgb   9.3 L D 


 


Hct   28.5 L 


 


MCV   73 L 


 


MCH   23.8 L 


 


MCHC   32.6 


 


RDW   17.0 H 


 


Plt Count   299 


 


Seg Neutrophils %   Not Reportable 


 


Sodium    135.1 L


 


Potassium    4.5


 


Chloride    106


 


Carbon Dioxide    24


 


Anion Gap    5


 


BUN    12


 


Creatinine    0.93


 


Est GFR ( Amer)    > 60


 


Glucose    128 H


 


Lactic Acid  1.2  


 


Calcium    7.6 L


 


Triglycerides    144


 


Cholesterol    86.40


 


LDL Cholesterol Direct    52


 


VLDL Cholesterol    29.0


 


HDL Cholesterol    12 L


 


TSH   














  04/25/20





  05:20


 


WBC 


 


RBC 


 


Hgb 


 


Hct 


 


MCV 


 


MCH 


 


MCHC 


 


RDW 


 


Plt Count 


 


Seg Neutrophils % 


 


Sodium 


 


Potassium 


 


Chloride 


 


Carbon Dioxide 


 


Anion Gap 


 


BUN 


 


Creatinine 


 


Est GFR (African Amer) 


 


Glucose 


 


Lactic Acid 


 


Calcium 


 


Triglycerides 


 


Cholesterol 


 


LDL Cholesterol Direct 


 


VLDL Cholesterol 


 


HDL Cholesterol 


 


TSH  3.57








                                        





04/24/20 02:06   Blood   Blood Culture (PCR) - Final


                            Staphylococcus Species


04/24/20 14:00   Buttocks - Abscess   Gram Stain - Final








Impressions: 


                                        





Extremity Ultrasound  04/24/20 05:34


IMPRESSION:


 


Moderate soft tissue edema/cellulitis of the right gluteal


region. Limitation.


 














Assessment & Plan





- Diagnosis


(1) Morbid obesity with BMI of 70 and over, adult


Is this a current diagnosis for this admission?: Yes   





(2) Necrotizing soft tissue infection


Is this a current diagnosis for this admission?: Yes   





- Plan Summary


Plan Summary: 





This is a 34-year-old morbidly obese female with a necrotizing soft tissue 

infection of the buttock and perirectal area.  Her dressing was changed by me 

today.  There is no sign of ongoing necrosis within the wound.  The edges of the

wound appear healthy.  The wound was packed with Kerlix.  Plan for damp dressing

changes twice daily.  Close observation will be mandatory for the wound.  She 

may require serial debridements, if further necrosis develops.  Continue current

antibiotics.  Surgery will follow this patient closely with you.

## 2020-04-25 NOTE — PDOC PROGRESS REPORT
Subjective


Progress Note for:: 04/25/20


Subjective:: 


ELVIRA BARKER is a 34 year old female with a past medical history of 

hypertension, depression, and morbid obesity who was admitted 4/24/2020 with a 

perirectal abscess requiring surgical intervention.





Patient was seen on morning rounds.  She is found resting in bed, comfortably, 

on room air.  She reports buttocks pain following dressing change this morning. 

Otherwise, she denies all symptoms of fever, chills, chest pain, palpitations, 

dyspnea, orthopnea, abdominal pain, nausea vomiting and diarrhea.


She does asked to have her pain medication adjusted so that it is better timed 

related to her dressing changes.  She also inquires about home health nursing fo

r wound care following discharge.


She has no other questions or concerns at this time.


No concerns per nursing.


Reason For Visit: 


SEPSIS,BUTTOCKS ABSCESS








Physical Exam


Vital Signs: 


                                        











Temp Pulse Resp BP Pulse Ox


 


 97.6 F   92   18   106/60   100 


 


 04/25/20 15:13  04/25/20 15:13  04/25/20 15:13  04/25/20 15:13  04/25/20 15:13








                                 Intake & Output











 04/24/20 04/25/20 04/26/20





 06:59 06:59 06:59


 


Intake Total  7792 1420


 


Output Total  90 


 


Balance  7702 1420


 


Weight 188.6 kg 213.1 kg 213.1 kg











General appearance: PRESENT: no acute distress, cooperative, morbidly obese, 

well-developed, well-nourished


Head exam: PRESENT: atraumatic, normocephalic


Eye exam: PRESENT: conjunctiva pink, EOMI, PERRLA.  ABSENT: scleral icterus


Ear exam: PRESENT: normal external ear exam


Mouth exam: PRESENT: moist, tongue midline


Respiratory exam: PRESENT: clear to auscultation viridiana, decreased breath sounds - 

Diminished throughout; secondary to body habitus, symmetrical, unlabored.  

ABSENT: rales, rhonchi, wheezes


Cardiovascular exam: PRESENT: RRR.  ABSENT: diastolic murmur, rubs, systolic 

murmur


Pulses: PRESENT: normal dorsalis pedis pul


Vascular exam: PRESENT: normal capillary refill


Extremities exam: PRESENT: full ROM.  ABSENT: calf tenderness, clubbing, pedal 

edema


Musculoskeletal exam: PRESENT: ambulatory


Neurological exam: PRESENT: alert, awake, oriented to person, oriented to place,

oriented to time, oriented to situation, CN II-XII grossly intact.  ABSENT: 

motor sensory deficit


Psychiatric exam: PRESENT: appropriate affect, normal mood.  ABSENT: homicidal 

ideation, suicidal ideation


Skin exam: PRESENT: dry, warm, other - Wound to right buttocks with surgical 

dressing in place.  ABSENT: cyanosis, intact, rash





Results


Laboratory Results: 


                                        





                                 04/25/20 05:20 





                                 04/25/20 05:20 





                                        











  04/25/20 04/25/20 04/25/20





  05:20 05:20 05:20


 


WBC  27.1 H  


 


RBC  3.90  


 


Hgb  9.3 L D  


 


Hct  28.5 L  


 


MCV  73 L  


 


MCH  23.8 L  


 


MCHC  32.6  


 


RDW  17.0 H  


 


Plt Count  299  


 


Seg Neutrophils %  Not Reportable  


 


Sodium   135.1 L 


 


Potassium   4.5 


 


Chloride   106 


 


Carbon Dioxide   24 


 


Anion Gap   5 


 


BUN   12 


 


Creatinine   0.93 


 


Est GFR ( Amer)   > 60 


 


Glucose   128 H 


 


Calcium   7.6 L 


 


Triglycerides   144 


 


Cholesterol   86.40 


 


LDL Cholesterol Direct   52 


 


VLDL Cholesterol   29.0 


 


HDL Cholesterol   12 L 


 


TSH    3.57








                                        





04/24/20 02:06   Blood   Blood Culture (PCR) - Final


                            Staphylococcus Species


04/24/20 14:00   Buttocks - Abscess   Gram Stain - Final








Impressions: 


                                        





Extremity Ultrasound  04/24/20 05:34


IMPRESSION:


 


Moderate soft tissue edema/cellulitis of the right gluteal


region. Limitation.


 














Assessment and Plan





- Diagnosis


(1) Perirectal abscess


Is this a current diagnosis for this admission?: Yes   


Plan: 


Now status post surgical I&D by Dr. Smith.


Wound culture shows gram-negative rods gram-positive cocci, and gram-positive 

rods


Blood culture (1 of 4 bottles) shows gram-positive cocci in clusters. 





Surgery is consulted; wound care per their expertise.


Patient is admitted to the medical floor.


She has been empirically placed on IV vancomycin and Zosyn.


Antiemetics and analgesics as needed.


Discharge planning is consulted








(2) Hypertension


Is this a current diagnosis for this admission?: Yes   


Plan: 


Continue home dose Lisinopril.


IV hydralazine as needed for blood pressure control.


Appropriate pain control.


Cardiac diet.








(3) Depression


Is this a current diagnosis for this admission?: Yes   


Plan: 


Continue home dose Lexapro.








(4) Morbid obesity with BMI of 60.0-69.9, adult


Is this a current diagnosis for this admission?: Yes   


Plan: 


BMI 69.2.


The patient's super morbid obesity requires additional nursing support.  


Bariatric bed.


She is at risk for further wound breakdown and/or respiratory complications.


Lifestyle modification dietary discretion are strongly advised.


TSH and lipid panel are acceptable other than low HDL.


A1c 6.6%


Registered dietitian is consulted.








(5) Prediabetes


Is this a current diagnosis for this admission?: Yes   


Plan: 


A1c 6.6%.


Discussed with patient has previously been on metformin.  We will plan on 

resuming at discharge.


While admitted, will provide Accu-Cheks before meals and at bedtime with Humalog

for sliding scale coverage.


Registered dietitian is consulted.








- Time


Time Spent with patient: 25-34 minutes


Medications reviewed and adjusted accordingly: Yes

## 2020-04-26 LAB
ERYTHROCYTE [DISTWIDTH] IN BLOOD BY AUTOMATED COUNT: 17.3 % (ref 11.5–14)
HCT VFR BLD CALC: 28.2 % (ref 36–47)
HGB BLD-MCNC: 9.1 G/DL (ref 12–15.5)
MCH RBC QN AUTO: 23.5 PG (ref 27–33.4)
MCHC RBC AUTO-ENTMCNC: 32.4 G/DL (ref 32–36)
MCV RBC AUTO: 73 FL (ref 80–97)
PLATELET # BLD: 312 10^3/UL (ref 150–450)
RBC # BLD AUTO: 3.88 10^6/UL (ref 3.72–5.28)
WBC # BLD AUTO: 21.7 10^3/UL (ref 4–10.5)

## 2020-04-26 RX ADMIN — HYDROCODONE BITARTRATE AND ACETAMINOPHEN PRN TAB: 10; 325 TABLET ORAL at 20:44

## 2020-04-26 RX ADMIN — VANCOMYCIN HYDROCHLORIDE SCH MLS/HR: 1 INJECTION, POWDER, LYOPHILIZED, FOR SOLUTION INTRAVENOUS at 15:07

## 2020-04-26 RX ADMIN — ESCITALOPRAM OXALATE SCH MG: 10 TABLET, FILM COATED ORAL at 09:11

## 2020-04-26 RX ADMIN — IBUPROFEN SCH MG: 800 TABLET, FILM COATED ORAL at 12:20

## 2020-04-26 RX ADMIN — HEPARIN SODIUM SCH UNIT: 5000 INJECTION, SOLUTION INTRAVENOUS; SUBCUTANEOUS at 05:22

## 2020-04-26 RX ADMIN — IBUPROFEN SCH MG: 800 TABLET, FILM COATED ORAL at 09:11

## 2020-04-26 RX ADMIN — LISINOPRIL SCH MG: 10 TABLET ORAL at 09:11

## 2020-04-26 RX ADMIN — INSULIN LISPRO SCH: 100 INJECTION, SOLUTION INTRAVENOUS; SUBCUTANEOUS at 17:23

## 2020-04-26 RX ADMIN — MORPHINE SULFATE PRN MG: 10 INJECTION INTRAMUSCULAR; INTRAVENOUS; SUBCUTANEOUS at 23:22

## 2020-04-26 RX ADMIN — SODIUM CHLORIDE PRN MLS/HR: 9 INJECTION, SOLUTION INTRAVENOUS at 05:21

## 2020-04-26 RX ADMIN — HYDROCODONE BITARTRATE AND ACETAMINOPHEN PRN TAB: 10; 325 TABLET ORAL at 15:14

## 2020-04-26 RX ADMIN — IBUPROFEN SCH MG: 800 TABLET, FILM COATED ORAL at 18:54

## 2020-04-26 RX ADMIN — DOCUSATE SODIUM SCH MG: 100 CAPSULE, LIQUID FILLED ORAL at 09:12

## 2020-04-26 RX ADMIN — HEPARIN SODIUM SCH UNIT: 5000 INJECTION, SOLUTION INTRAVENOUS; SUBCUTANEOUS at 15:07

## 2020-04-26 RX ADMIN — PIPERACILLIN AND TAZOBACTAM SCH MLS/HR: 3; .375 INJECTION, POWDER, LYOPHILIZED, FOR SOLUTION INTRAVENOUS; PARENTERAL at 12:20

## 2020-04-26 RX ADMIN — INSULIN LISPRO SCH: 100 INJECTION, SOLUTION INTRAVENOUS; SUBCUTANEOUS at 08:32

## 2020-04-26 RX ADMIN — INSULIN LISPRO SCH UNIT: 100 INJECTION, SOLUTION INTRAVENOUS; SUBCUTANEOUS at 12:19

## 2020-04-26 RX ADMIN — HEPARIN SODIUM SCH UNIT: 5000 INJECTION, SOLUTION INTRAVENOUS; SUBCUTANEOUS at 21:36

## 2020-04-26 RX ADMIN — INSULIN LISPRO SCH: 100 INJECTION, SOLUTION INTRAVENOUS; SUBCUTANEOUS at 21:30

## 2020-04-26 RX ADMIN — SODIUM CHLORIDE PRN MLS/HR: 9 INJECTION, SOLUTION INTRAVENOUS at 09:15

## 2020-04-26 RX ADMIN — FAMOTIDINE SCH MG: 10 INJECTION INTRAVENOUS at 09:12

## 2020-04-26 RX ADMIN — VANCOMYCIN HYDROCHLORIDE SCH MLS/HR: 1 INJECTION, POWDER, LYOPHILIZED, FOR SOLUTION INTRAVENOUS at 05:54

## 2020-04-26 RX ADMIN — PIPERACILLIN AND TAZOBACTAM SCH MLS/HR: 3; .375 INJECTION, POWDER, LYOPHILIZED, FOR SOLUTION INTRAVENOUS; PARENTERAL at 05:22

## 2020-04-26 RX ADMIN — PIPERACILLIN AND TAZOBACTAM SCH MLS/HR: 3; .375 INJECTION, POWDER, LYOPHILIZED, FOR SOLUTION INTRAVENOUS; PARENTERAL at 18:55

## 2020-04-26 RX ADMIN — FAMOTIDINE SCH MG: 10 INJECTION INTRAVENOUS at 21:35

## 2020-04-26 RX ADMIN — DOCUSATE SODIUM SCH: 100 CAPSULE, LIQUID FILLED ORAL at 18:55

## 2020-04-26 RX ADMIN — VANCOMYCIN HYDROCHLORIDE SCH MLS/HR: 1 INJECTION, POWDER, LYOPHILIZED, FOR SOLUTION INTRAVENOUS at 21:35

## 2020-04-26 RX ADMIN — MORPHINE SULFATE PRN MG: 10 INJECTION INTRAMUSCULAR; INTRAVENOUS; SUBCUTANEOUS at 09:10

## 2020-04-26 NOTE — PDOC PROGRESS REPORT
Subjective


Progress Note for:: 04/26/20


Subjective:: 





feels ok


passing stool


has control of stool and flatus


Reason For Visit: 


SEPSIS,BUTTOCKS ABSCESS








Physical Exam


Vital Signs: 


                                        











Temp Pulse Resp BP Pulse Ox


 


 97.4 F   96   19   140/89 H  98 


 


 04/25/20 23:16  04/25/20 23:16  04/25/20 23:16  04/25/20 23:16  04/25/20 23:16








                                 Intake & Output











 04/25/20 04/26/20 04/27/20





 06:59 06:59 06:59


 


Intake Total 7792 3783 


 


Output Total 90  


 


Balance 7702 3783 


 


Weight 213.1 kg 212.8 kg 











General appearance: PRESENT: no acute distress, morbidly obese


Head exam: PRESENT: normocephalic


Eye exam: PRESENT: EOMI


Ear exam: PRESENT: normal external ear exam


Mouth exam: PRESENT: moist


Neck exam: PRESENT: full ROM


Respiratory exam: PRESENT: clear to auscultation viridiana


Cardiovascular exam: PRESENT: RRR


Pulses: PRESENT: normal radial pulses, normal femoral pulses


Vascular exam: PRESENT: normal capillary refill


Breast: PRESENT: Normal


GI/Abdominal exam: PRESENT: soft


Rectal exam: PRESENT: other - large right ischeal abscess, now debrided. wound 

digitalized clean, no necrotic tissue no loculated fluid collections


Extremities exam: PRESENT: full ROM


Musculoskeletal exam: PRESENT: full ROM


Neurological exam: PRESENT: alert, awake, oriented to person, oriented to place,

oriented to time


Psychiatric exam: PRESENT: anxious


Skin exam: PRESENT: dry





Results


Laboratory Results: 


                                        





                                 04/26/20 04:39 





                                 04/25/20 05:20 





                                        











  04/26/20





  04:39


 


WBC  21.7 H


 


RBC  3.88


 


Hgb  9.1 L


 


Hct  28.2 L


 


MCV  73 L


 


MCH  23.5 L


 


MCHC  32.4


 


RDW  17.3 H


 


Plt Count  312








                                        





04/24/20 02:06   Blood   Blood Culture (PCR) - Final


                            Staphylococcus Species








Impressions: 


                                        





Extremity Ultrasound  04/24/20 05:34


IMPRESSION:


 


Moderate soft tissue edema/cellulitis of the right gluteal


region. Limitation.


 














Assessment & Plan





- Plan Summary


Plan Summary: 





s/p excisional debridement of large ischial abscess


wound clean, being paciked


will consider vac in 2-3 days.

## 2020-04-26 NOTE — CDI QUERY
CDI Query


CDI Review: 





Dear Provider:


 To better reflect your patients severity of illness, morbidity, and resource 


utilization    


 Please specify and document in the Progress Notes and Discharge Summary        


                                                                   if you are 

monitoring / treating / evaluating any of the following conditions:











                                             Query                              

    Clinical indicators                       


 


   Acute blood loss anemia





   Post-op blood loss anemia





   Dilutional anemia





   Unable to determine





   Other


 Perianal right buttock abscess with necrosis








OPERATION: Wide excisional debridement of right buttock and rochelle-anal abscess


Blood loss: 200 cc





 H/H on admission 11.5/34.7


 H/H  Post-op             9.3/28.5








  The terms probable, suspected, likely, possible or still to be ruled 

out may be used if you are unable to determine the exact nature of a condition.


Thank you for your consideration,


                    Clinical Documentation Physician Advisors





                                          DAPHNE Jones RN, BSN RN [email or web address]                 

                                        [email or web address] 


                  Office [phone]        Cell [phone]                  

   Office [phone]        Cell [phone]

## 2020-04-26 NOTE — PDOC PROGRESS REPORT
Subjective


Progress Note for:: 04/26/20


Subjective:: 


ELVIRA BARKER is a 34 year old female with a past medical history of 

hypertension, depression, and morbid obesity who was admitted 4/24/2020 with a 

perirectal abscess requiring surgical intervention.





Patient was seen on morning rounds.  She is found resting in bed, comfortably, 

on supplemental oxygen via NC.  She reports buttocks pain following dressing 

change this morning.  Otherwise, she denies all symptoms of fever, chills, chest

pain, palpitations, dyspnea, orthopnea, abdominal pain, nausea vomiting and 

diarrhea.


She has no other questions or concerns at this time.


No concerns per nursing.


Reason For Visit: 


SEPSIS,BUTTOCKS ABSCESS








Physical Exam


Vital Signs: 


                                        











Temp Pulse Resp BP Pulse Ox


 


 98.9 F   96   20   147/78 H  100 


 


 04/26/20 08:00  04/26/20 08:00  04/26/20 08:00  04/26/20 08:00  04/26/20 08:00








                                 Intake & Output











 04/25/20 04/26/20 04/27/20





 06:59 06:59 06:59


 


Intake Total 7792 3783 0


 


Output Total 90  


 


Balance 7702 3783 0


 


Weight 213.1 kg 212.8 kg 











General appearance: PRESENT: no acute distress, cooperative, morbidly obese, 

well-developed, well-nourished


Head exam: PRESENT: atraumatic, normocephalic


Eye exam: PRESENT: conjunctiva pink, EOMI, PERRLA.  ABSENT: scleral icterus


Ear exam: PRESENT: normal external ear exam


Mouth exam: PRESENT: moist, tongue midline


Respiratory exam: PRESENT: decreased breath sounds - throughout r/t body 

habitus, unlabored


Cardiovascular exam: PRESENT: RRR.  ABSENT: diastolic murmur, rubs, systolic 

murmur


Vascular exam: PRESENT: normal capillary refill


Rectal exam: PRESENT: deferred


Extremities exam: PRESENT: full ROM.  ABSENT: calf tenderness, clubbing, pedal 

edema


Neurological exam: PRESENT: alert, awake, oriented to person, oriented to place,

oriented to time, oriented to situation, CN II-XII grossly intact.  ABSENT: 

motor sensory deficit


Psychiatric exam: PRESENT: appropriate affect, normal mood.  ABSENT: homicidal 

ideation, suicidal ideation


Skin exam: PRESENT: dry, warm, other - Wound to right buttocks with surgical 

dressing in place.  ABSENT: cyanosis, intact, rash





Results


Laboratory Results: 


                                        





                                 04/26/20 04:39 





                                 04/25/20 05:20 





                                        











  04/26/20





  04:39


 


WBC  21.7 H


 


RBC  3.88


 


Hgb  9.1 L


 


Hct  28.2 L


 


MCV  73 L


 


MCH  23.5 L


 


MCHC  32.4


 


RDW  17.3 H


 


Plt Count  312








                                        





04/24/20 02:06   Blood   Blood Culture (PCR) - Final


                            Staphylococcus Species








Impressions: 


                                        





Extremity Ultrasound  04/24/20 05:34


IMPRESSION:


 


Moderate soft tissue edema/cellulitis of the right gluteal


region. Limitation.


 














Assessment and Plan





- Diagnosis


(1) Perirectal abscess


Is this a current diagnosis for this admission?: Yes   


Plan: 


Now status post surgical I&D by Dr. Smith.


Wound culture shows gram-negative rods gram-positive cocci, and gram-positive 

rods


Blood culture (1 of 4 bottles) shows gram-positive cocci in clusters. 


WBCs trending down





Surgery is consulted; wound care per their expertise.


Patient is admitted to the medical floor.


She has been empirically placed on IV vancomycin and Zosyn.


Antiemetics and analgesics as needed.


Discharge planning is consulted








(2) Hypertension


Is this a current diagnosis for this admission?: Yes   


Plan: 


Continue home dose Lisinopril.


IV hydralazine as needed for blood pressure control.


Appropriate pain control.


Cardiac diet.








(3) Depression


Is this a current diagnosis for this admission?: Yes   


Plan: 


Continue home dose Lexapro.








(4) Morbid obesity with BMI of 60.0-69.9, adult


Is this a current diagnosis for this admission?: Yes   


Plan: 


BMI 69.2.


The patient's super morbid obesity requires additional nursing support.  


Bariatric bed.


She is at risk for further wound breakdown and/or respiratory complications.


Lifestyle modification dietary discretion are strongly advised.


TSH and lipid panel are acceptable other than low HDL.


A1c 6.6%


Registered dietitian is consulted.








(5) Prediabetes


Is this a current diagnosis for this admission?: Yes   


Plan: 


A1c 6.6%.


Discussed with patient has previously been on metformin.  We will plan on 

resuming at discharge.


While admitted, will provide Accu-Cheks before meals and at bedtime with Humalog

for sliding scale coverage.


Registered dietitian is consulted.








- Time


Time Spent with patient: 25-34 minutes


Medications reviewed and adjusted accordingly: Yes


Anticipated discharge: Home with Homehealth

## 2020-04-27 LAB
ERYTHROCYTE [DISTWIDTH] IN BLOOD BY AUTOMATED COUNT: 17.4 % (ref 11.5–14)
HCT VFR BLD CALC: 29 % (ref 36–47)
HGB BLD-MCNC: 9.5 G/DL (ref 12–15.5)
MCH RBC QN AUTO: 23.9 PG (ref 27–33.4)
MCHC RBC AUTO-ENTMCNC: 32.6 G/DL (ref 32–36)
MCV RBC AUTO: 73 FL (ref 80–97)
PLATELET # BLD: 335 10^3/UL (ref 150–450)
RBC # BLD AUTO: 3.96 10^6/UL (ref 3.72–5.28)
VANCOMYCIN,TROUGH: 16.5 UG/ML (ref 5–20)
WBC # BLD AUTO: 14 10^3/UL (ref 4–10.5)

## 2020-04-27 RX ADMIN — HEPARIN SODIUM SCH UNIT: 5000 INJECTION, SOLUTION INTRAVENOUS; SUBCUTANEOUS at 13:14

## 2020-04-27 RX ADMIN — HEPARIN SODIUM SCH UNIT: 5000 INJECTION, SOLUTION INTRAVENOUS; SUBCUTANEOUS at 05:12

## 2020-04-27 RX ADMIN — HYDROCODONE BITARTRATE AND ACETAMINOPHEN PRN TAB: 10; 325 TABLET ORAL at 13:17

## 2020-04-27 RX ADMIN — VANCOMYCIN HYDROCHLORIDE SCH: 1 INJECTION, POWDER, LYOPHILIZED, FOR SOLUTION INTRAVENOUS at 15:07

## 2020-04-27 RX ADMIN — CEFTRIAXONE SCH MLS/HR: 2 INJECTION, SOLUTION INTRAVENOUS at 16:19

## 2020-04-27 RX ADMIN — VANCOMYCIN HYDROCHLORIDE SCH MLS/HR: 1 INJECTION, POWDER, LYOPHILIZED, FOR SOLUTION INTRAVENOUS at 06:06

## 2020-04-27 RX ADMIN — FAMOTIDINE SCH MG: 10 INJECTION INTRAVENOUS at 10:47

## 2020-04-27 RX ADMIN — PIPERACILLIN AND TAZOBACTAM SCH MLS/HR: 3; .375 INJECTION, POWDER, LYOPHILIZED, FOR SOLUTION INTRAVENOUS; PARENTERAL at 13:10

## 2020-04-27 RX ADMIN — IBUPROFEN SCH MG: 800 TABLET, FILM COATED ORAL at 16:18

## 2020-04-27 RX ADMIN — INSULIN LISPRO SCH: 100 INJECTION, SOLUTION INTRAVENOUS; SUBCUTANEOUS at 08:02

## 2020-04-27 RX ADMIN — INSULIN LISPRO SCH: 100 INJECTION, SOLUTION INTRAVENOUS; SUBCUTANEOUS at 12:39

## 2020-04-27 RX ADMIN — HEPARIN SODIUM SCH: 5000 INJECTION, SOLUTION INTRAVENOUS; SUBCUTANEOUS at 21:56

## 2020-04-27 RX ADMIN — DOCUSATE SODIUM SCH: 100 CAPSULE, LIQUID FILLED ORAL at 10:46

## 2020-04-27 RX ADMIN — LISINOPRIL SCH MG: 10 TABLET ORAL at 10:47

## 2020-04-27 RX ADMIN — MORPHINE SULFATE PRN MG: 10 INJECTION INTRAMUSCULAR; INTRAVENOUS; SUBCUTANEOUS at 23:48

## 2020-04-27 RX ADMIN — ESCITALOPRAM OXALATE SCH MG: 10 TABLET, FILM COATED ORAL at 10:47

## 2020-04-27 RX ADMIN — DOCUSATE SODIUM SCH: 100 CAPSULE, LIQUID FILLED ORAL at 21:44

## 2020-04-27 RX ADMIN — INSULIN LISPRO SCH UNIT: 100 INJECTION, SOLUTION INTRAVENOUS; SUBCUTANEOUS at 16:26

## 2020-04-27 RX ADMIN — SODIUM CHLORIDE PRN MLS/HR: 9 INJECTION, SOLUTION INTRAVENOUS at 02:50

## 2020-04-27 RX ADMIN — PIPERACILLIN AND TAZOBACTAM SCH MLS/HR: 3; .375 INJECTION, POWDER, LYOPHILIZED, FOR SOLUTION INTRAVENOUS; PARENTERAL at 00:06

## 2020-04-27 RX ADMIN — HYDROCODONE BITARTRATE AND ACETAMINOPHEN PRN TAB: 10; 325 TABLET ORAL at 17:48

## 2020-04-27 RX ADMIN — PIPERACILLIN AND TAZOBACTAM SCH MLS/HR: 3; .375 INJECTION, POWDER, LYOPHILIZED, FOR SOLUTION INTRAVENOUS; PARENTERAL at 05:12

## 2020-04-27 RX ADMIN — IBUPROFEN SCH MG: 800 TABLET, FILM COATED ORAL at 08:08

## 2020-04-27 RX ADMIN — HYDROCODONE BITARTRATE AND ACETAMINOPHEN PRN TAB: 10; 325 TABLET ORAL at 08:08

## 2020-04-27 RX ADMIN — INSULIN LISPRO SCH: 100 INJECTION, SOLUTION INTRAVENOUS; SUBCUTANEOUS at 21:56

## 2020-04-27 RX ADMIN — IBUPROFEN SCH MG: 800 TABLET, FILM COATED ORAL at 13:09

## 2020-04-27 RX ADMIN — FAMOTIDINE SCH MG: 10 INJECTION INTRAVENOUS at 21:57

## 2020-04-27 NOTE — PDOC PROGRESS REPORT
Subjective


Progress Note for:: 04/27/20


Subjective:: 





Follow-up of excision of abscess left buttock.  Patient says she feels better


Reason For Visit: 


SEPSIS,BUTTOCKS ABSCESS








Physical Exam


Vital Signs: 


                                        











Temp Pulse Resp BP Pulse Ox


 


 98.3 F   95   17   133/73 H  96 


 


 04/27/20 11:40  04/27/20 11:40  04/27/20 11:40  04/27/20 11:40  04/27/20 11:40








                                 Intake & Output











 04/26/20 04/27/20 04/28/20





 06:59 06:59 06:59


 


Intake Total 3783 2130 710


 


Balance 3783 2130 710


 


Weight 212.8 kg 213.2 kg 











General appearance: PRESENT: no acute distress, morbidly obese, well-developed, 

well-nourished


Head exam: PRESENT: atraumatic, normocephalic


Eye exam: PRESENT: conjunctiva pink, EOMI, PERRLA.  ABSENT: scleral icterus


Mouth exam: PRESENT: tongue midline


Neck exam: ABSENT: carotid bruit, JVD, lymphadenopathy, thyromegaly


Respiratory exam: PRESENT: clear to auscultation viridiana.  ABSENT: rales, rhonchi, 

wheezes


Cardiovascular exam: PRESENT: RRR, +S1, +S2.  ABSENT: diastolic murmur, rubs, 

systolic murmur


Pulses: PRESENT: normal dorsalis pedis pul


Vascular exam: PRESENT: normal capillary refill


GI/Abdominal exam: PRESENT: normal bowel sounds, soft.  ABSENT: distended, guar

ding, mass, organolmegaly, rebound, tenderness


Rectal exam: PRESENT: deferred


Extremities exam: PRESENT: full ROM.  ABSENT: calf tenderness, clubbing, pedal 

edema


Musculoskeletal exam: PRESENT: other - Dressing over incision L buttock


Neurological exam: PRESENT: alert, awake, oriented to person, oriented to place,

oriented to time, oriented to situation, CN II-XII grossly intact.  ABSENT: 

motor sensory deficit


Psychiatric exam: PRESENT: appropriate affect, normal mood.  ABSENT: homicidal 

ideation, suicidal ideation


Skin exam: PRESENT: dry, intact, warm.  ABSENT: cyanosis, rash





Results


Laboratory Results: 


                                        





                                 04/27/20 05:30 





                                 04/27/20 13:41 





                                        











  04/27/20 04/27/20





  05:30 13:41


 


WBC  14.0 H 


 


RBC  3.96 


 


Hgb  9.5 L 


 


Hct  29.0 L 


 


MCV  73 L 


 


MCH  23.9 L 


 


MCHC  32.6 


 


RDW  17.4 H 


 


Plt Count  335 


 


Creatinine   0.76


 


Est GFR ( Amer)   > 60








                                        





04/24/20 02:06   Buttocks - Abscess   Gram Stain - Final


04/24/20 14:00   Buttocks - Abscess   Gram Stain - Final








Impressions: 


                                        





Extremity Ultrasound  04/24/20 05:34


IMPRESSION:


 


Moderate soft tissue edema/cellulitis of the right gluteal


region. Limitation.


 














Assessment and Plan





- Diagnosis


(1) Morbid obesity with BMI of 70 and over, adult


Is this a current diagnosis for this admission?: Yes   





(2) Perirectal abscess


Is this a current diagnosis for this admission?: Yes   


Plan: 


Now status post surgical I&D by Dr. Smith.


Wound culture shows gram-negative rods gram-positive cocci, and gram-positive 

rods


Blood culture (1 of 4 bottles) shows gram-positive cocci in clusters. 


WBCs trending down





Surgery is consulted; wound care per their expertise.


Patient is admitted to the medical floor.


She has been empirically placed on IV vancomycin and Zosyn.


Antiemetics and analgesics as needed.


Discharge planning is consulted





4/27 Will change abx to Ceftriaxone








(3) Sepsis


Qualifiers: 


   Sepsis type: sepsis due to unspecified organism 


Is this a current diagnosis for this admission?: Yes   


Plan: 


Patient was septic on admission with a temperature of 101.7, tachycardia with a 

pulse rate of 133, leukocytosis with a white count of 29,000 and acute kidney 

injury.








(4) Hypertension


Is this a current diagnosis for this admission?: Yes   





(5) Prediabetes


Is this a current diagnosis for this admission?: Yes   





- Plan Summary


Summary: 


Wound culture is yielding multiple organisms including gram-positive cocci in 

chains, Clostridium non-perfringens, E. coli and she had a blood culture that 

grew Staphylococcus in clusters.  Blood culture has been negative.  There was 

just 1 blood culture out of 4+ possibly contaminant.  Looking at a profile of 

antibiotic sensitivity including E. coli I think ceftriaxone is probably a good 

choice as this will also cover the multiple gram-positive organisms.  I will 

change antibiotics to ceftriaxone.  Her white count is down to 14,000

## 2020-04-27 NOTE — PDOC PROGRESS REPORT
Subjective


Progress Note for:: 04/27/20


Subjective:: 





feels ok, no complaits


Reason For Visit: 


SEPSIS,BUTTOCKS ABSCESS








Physical Exam


Vital Signs: 


                                        











Temp Pulse Resp BP Pulse Ox


 


 98.1 F   102 H  18   140/80 H  96 


 


 04/27/20 07:20  04/27/20 07:20  04/27/20 07:20  04/27/20 07:20  04/27/20 08:23








                                 Intake & Output











 04/26/20 04/27/20 04/28/20





 06:59 06:59 06:59


 


Intake Total 3783 2130 100


 


Balance 3783 2130 100


 


Weight 212.8 kg 213.2 kg 











General appearance: PRESENT: no acute distress, morbidly obese


Head exam: PRESENT: atraumatic


Eye exam: PRESENT: EOMI


Mouth exam: PRESENT: moist


Neck exam: PRESENT: full ROM


Respiratory exam: PRESENT: clear to auscultation viridiana


Cardiovascular exam: PRESENT: RRR


Pulses: PRESENT: normal femoral pulses, normal dorsalis pedis pul


Breast: PRESENT: Normal - morbid obesity


GI/Abdominal exam: PRESENT: soft


Rectal exam: PRESENT: other - left ischial abscess cavity with early granulation

tissue  still some purulent exutate at depth of wound


Extremities exam: PRESENT: full ROM


Musculoskeletal exam: PRESENT: ambulatory


Neurological exam: PRESENT: alert, awake, oriented to person, oriented to place


Psychiatric exam: PRESENT: appropriate affect


Skin exam: PRESENT: dry





Results


Laboratory Results: 


                                        





                                 04/27/20 05:30 





                                 04/25/20 05:20 





                                        











  04/27/20





  05:30


 


WBC  14.0 H


 


RBC  3.96


 


Hgb  9.5 L


 


Hct  29.0 L


 


MCV  73 L


 


MCH  23.9 L


 


MCHC  32.6


 


RDW  17.4 H


 


Plt Count  335








                                        





04/24/20 02:06   Blood   Blood Culture (PCR) - Final


                            Staphylococcus Species








Impressions: 


                                        





Extremity Ultrasound  04/24/20 05:34


IMPRESSION:


 


Moderate soft tissue edema/cellulitis of the right gluteal


region. Limitation.


 














Assessment & Plan





- Plan Summary


Plan Summary: 





s/p debrident of ischial abscess


perirectal


this am noted some min increased purulent fluid


at depth of cavity, dressing not placed in deep enough


nursing reeducated about howdeep to place gauze\


will cont dressing changes today, if still present in am


will consider redebridement.

## 2020-04-28 LAB
ERYTHROCYTE [DISTWIDTH] IN BLOOD BY AUTOMATED COUNT: 17 % (ref 11.5–14)
HCT VFR BLD CALC: 27.9 % (ref 36–47)
HGB BLD-MCNC: 9.2 G/DL (ref 12–15.5)
MCH RBC QN AUTO: 24 PG (ref 27–33.4)
MCHC RBC AUTO-ENTMCNC: 32.9 G/DL (ref 32–36)
MCV RBC AUTO: 73 FL (ref 80–97)
PLATELET # BLD: 387 10^3/UL (ref 150–450)
RBC # BLD AUTO: 3.84 10^6/UL (ref 3.72–5.28)
WBC # BLD AUTO: 16.9 10^3/UL (ref 4–10.5)

## 2020-04-28 RX ADMIN — IBUPROFEN SCH MG: 800 TABLET, FILM COATED ORAL at 17:33

## 2020-04-28 RX ADMIN — ESCITALOPRAM OXALATE SCH MG: 10 TABLET, FILM COATED ORAL at 09:19

## 2020-04-28 RX ADMIN — LISINOPRIL SCH MG: 10 TABLET ORAL at 09:20

## 2020-04-28 RX ADMIN — HYDROCODONE BITARTRATE AND ACETAMINOPHEN PRN TAB: 10; 325 TABLET ORAL at 06:31

## 2020-04-28 RX ADMIN — DOCUSATE SODIUM SCH MG: 100 CAPSULE, LIQUID FILLED ORAL at 17:20

## 2020-04-28 RX ADMIN — IBUPROFEN SCH MG: 800 TABLET, FILM COATED ORAL at 07:53

## 2020-04-28 RX ADMIN — FAMOTIDINE SCH MG: 10 INJECTION INTRAVENOUS at 21:50

## 2020-04-28 RX ADMIN — CEFTRIAXONE SCH MLS/HR: 2 INJECTION, SOLUTION INTRAVENOUS at 10:01

## 2020-04-28 RX ADMIN — INSULIN LISPRO SCH: 100 INJECTION, SOLUTION INTRAVENOUS; SUBCUTANEOUS at 16:02

## 2020-04-28 RX ADMIN — INSULIN LISPRO SCH: 100 INJECTION, SOLUTION INTRAVENOUS; SUBCUTANEOUS at 07:49

## 2020-04-28 RX ADMIN — INSULIN LISPRO SCH: 100 INJECTION, SOLUTION INTRAVENOUS; SUBCUTANEOUS at 12:21

## 2020-04-28 RX ADMIN — HEPARIN SODIUM SCH: 5000 INJECTION, SOLUTION INTRAVENOUS; SUBCUTANEOUS at 13:23

## 2020-04-28 RX ADMIN — FAMOTIDINE SCH MG: 10 INJECTION INTRAVENOUS at 09:19

## 2020-04-28 RX ADMIN — MORPHINE SULFATE PRN MG: 10 INJECTION INTRAMUSCULAR; INTRAVENOUS; SUBCUTANEOUS at 08:22

## 2020-04-28 RX ADMIN — HEPARIN SODIUM SCH: 5000 INJECTION, SOLUTION INTRAVENOUS; SUBCUTANEOUS at 21:44

## 2020-04-28 RX ADMIN — IBUPROFEN SCH MG: 800 TABLET, FILM COATED ORAL at 13:43

## 2020-04-28 RX ADMIN — DOCUSATE SODIUM SCH: 100 CAPSULE, LIQUID FILLED ORAL at 17:31

## 2020-04-28 RX ADMIN — DOCUSATE SODIUM SCH MG: 100 CAPSULE, LIQUID FILLED ORAL at 09:19

## 2020-04-28 RX ADMIN — MORPHINE SULFATE PRN MG: 10 INJECTION INTRAMUSCULAR; INTRAVENOUS; SUBCUTANEOUS at 21:50

## 2020-04-28 RX ADMIN — HEPARIN SODIUM SCH: 5000 INJECTION, SOLUTION INTRAVENOUS; SUBCUTANEOUS at 05:21

## 2020-04-28 RX ADMIN — INSULIN LISPRO SCH: 100 INJECTION, SOLUTION INTRAVENOUS; SUBCUTANEOUS at 21:47

## 2020-04-28 NOTE — PDOC PROGRESS REPORT
Subjective


Progress Note for:: 04/28/20


Reason For Visit: 


SEPSIS,BUTTOCKS ABSCESS








Physical Exam


Vital Signs: 


                                        











Temp Pulse Resp BP Pulse Ox


 


 97.6 F   95   16   141/87 H  93 


 


 04/28/20 12:00  04/28/20 12:00  04/28/20 12:00  04/28/20 12:00  04/28/20 12:00








                                 Intake & Output











 04/27/20 04/28/20 04/29/20





 06:59 06:59 06:59


 


Intake Total 2130 2120 512


 


Balance 2130 2120 512


 


Weight 213.2 kg 213.2 kg 213.2 kg














Results


Laboratory Results: 


                                        





                                 04/28/20 05:19 





                                 04/27/20 13:41 





                                        











  04/27/20 04/28/20





  13:41 05:19


 


WBC   16.9 H


 


RBC   3.84


 


Hgb   9.2 L


 


Hct   27.9 L


 


MCV   73 L


 


MCH   24.0 L


 


MCHC   32.9


 


RDW   17.0 H


 


Plt Count   387


 


Creatinine  0.76 


 


Est GFR ( Amer)  > 60 








                                        





04/24/20 14:00   Buttocks - Abscess   Gram Stain - Final


04/24/20 14:00   Buttocks - Abscess   Wound Culture - Final


                            Escherichia Coli


                            Prevotella Species


04/24/20 02:06   Blood   Blood Culture (PCR) - Final


                            Staphylococcus Species


04/24/20 02:06   Blood   Blood Culture - Final


                            Staphylococcus Hominis


04/24/20 02:06   Buttocks - Abscess   Gram Stain - Final


04/24/20 02:06   Buttocks - Abscess   Wound Culture - Final


                            Escherichia Coli


                            Enterococcus Faecalis(Group D)


                            Peptostreptococcus Species


                            Prevotella Species


                            Clostridium Sp.not Perfringens








Impressions: 


                                        





Extremity Ultrasound  04/24/20 05:34


IMPRESSION:


 


Moderate soft tissue edema/cellulitis of the right gluteal


region. Limitation.


 














Assessment & Plan





- Diagnosis


(1) Morbid obesity with BMI of 70 and over, adult


Is this a current diagnosis for this admission?: Yes   





(2) Necrotizing soft tissue infection


Is this a current diagnosis for this admission?: Yes   





- Plan Summary


Plan Summary: 





This is a 34-year-old female with a necrotizing soft tissue infection of the 

buttock and perineum.  The patient is status post wide debridement of all 

necrotic tissue.  Overall, her wound looks good.  There is a small amount of 

fibrinous slough at the very proximal base of the wound.  There is no active 

purulence at present.  Her leukocytosis is improving.  Restart diet today.  

N.p.o. after midnight with reevaluation tomorrow.  If there is worsening of the 

wound, she may require further debridement tomorrow.  That decision will be made

tomorrow morning.  Continue antibiotics.  Out of bed.  Continue dressing 

changes.

## 2020-04-28 NOTE — PDOC PROGRESS REPORT
Subjective


Progress Note for:: 04/28/20


Subjective:: 





Follow-up ofI and D of abscess left buttock.  Patient says she feels better


No new complaints


Reason For Visit: 


SEPSIS,BUTTOCKS ABSCESS








Physical Exam


Vital Signs: 


                                        











Temp Pulse Resp BP Pulse Ox


 


 97.6 F   95   16   141/87 H  93 


 


 04/28/20 12:00  04/28/20 12:00  04/28/20 12:00  04/28/20 12:00  04/28/20 12:00








                                 Intake & Output











 04/27/20 04/28/20 04/29/20





 06:59 06:59 06:59


 


Intake Total 2130 2120 512


 


Balance 2130 2120 512


 


Weight 213.2 kg 213.2 kg 213.2 kg











General appearance: PRESENT: no acute distress, morbidly obese


Head exam: PRESENT: atraumatic, normocephalic


Eye exam: PRESENT: conjunctiva pink, EOMI, PERRLA.  ABSENT: scleral icterus


Ear exam: PRESENT: normal external ear exam


Mouth exam: PRESENT: moist, tongue midline


Neck exam: ABSENT: carotid bruit, JVD, lymphadenopathy, thyromegaly


Respiratory exam: PRESENT: clear to auscultation viridiana, unlabored.  ABSENT: rales,

rhonchi, wheezes


Cardiovascular exam: PRESENT: RRR, +S1, +S2.  ABSENT: diastolic murmur, rubs, 

systolic murmur


Pulses: PRESENT: normal dorsalis pedis pul


Vascular exam: PRESENT: normal capillary refill


GI/Abdominal exam: PRESENT: normal bowel sounds, soft.  ABSENT: distended, 

guarding, mass, organolmegaly, rebound, tenderness


Rectal exam: PRESENT: deferred, other - R Butt abscess


Extremities exam: PRESENT: full ROM.  ABSENT: calf tenderness, clubbing, pedal 

edema


Musculoskeletal exam: PRESENT: ambulatory


Neurological exam: PRESENT: alert, awake, oriented to person, oriented to place,

oriented to time, oriented to situation, CN II-XII grossly intact.  ABSENT: 

motor sensory deficit


Psychiatric exam: PRESENT: appropriate affect, normal mood.  ABSENT: homicidal 

ideation, suicidal ideation


Skin exam: PRESENT: dry, intact, warm.  ABSENT: cyanosis, rash





Results


Laboratory Results: 


                                        





                                 04/28/20 05:19 





                                 04/27/20 13:41 





                                        











  04/28/20





  05:19


 


WBC  16.9 H


 


RBC  3.84


 


Hgb  9.2 L


 


Hct  27.9 L


 


MCV  73 L


 


MCH  24.0 L


 


MCHC  32.9


 


RDW  17.0 H


 


Plt Count  387








                                        





04/24/20 14:00   Buttocks - Abscess   Gram Stain - Final


04/24/20 14:00   Buttocks - Abscess   Wound Culture - Final


                            Escherichia Coli


                            Prevotella Species


04/24/20 02:06   Blood   Blood Culture (PCR) - Final


                            Staphylococcus Species


04/24/20 02:06   Blood   Blood Culture - Final


                            Staphylococcus Hominis


04/24/20 02:06   Buttocks - Abscess   Gram Stain - Final


04/24/20 02:06   Buttocks - Abscess   Wound Culture - Final


                            Escherichia Coli


                            Enterococcus Faecalis(Group D)


                            Peptostreptococcus Species


                            Prevotella Species


                            Clostridium Sp.not Perfringens








Impressions: 


                                        





Extremity Ultrasound  04/24/20 05:34


IMPRESSION:


 


Moderate soft tissue edema/cellulitis of the right gluteal


region. Limitation.


 














Assessment and Plan





- Diagnosis


(1) Morbid obesity with BMI of 70 and over, adult


Is this a current diagnosis for this admission?: Yes   





(2) Perirectal abscess


Is this a current diagnosis for this admission?: Yes   





(3) Sepsis


Qualifiers: 


   Sepsis type: sepsis due to unspecified organism 


Is this a current diagnosis for this admission?: Yes   





(4) Hypertension


Is this a current diagnosis for this admission?: Yes   





(5) Prediabetes


Is this a current diagnosis for this admission?: Yes   





- Plan Summary


Summary: 


Wound culture is yielding multiple organisms including gram-positive cocci in 

chains, Clostridium non-perfringens, E. coli and she had a blood culture that 

grew Staphylococcus in clusters.  Blood culture has been negative.  There was ju

st 1 blood culture out of 4+ possibly contaminant.  Looking at a profile of 

antibiotic sensitivity including E. coli I think ceftriaxone is probably a good 

choice as this will also cover the multiple gram-positive organisms.  I will 

change antibiotics to ceftriaxone.  Her white count is down to 14,000





4/28 count is slightly elevated however patient remains clinically stable.  I 

did change to ceftriaxone yesterday.  Cultures do seem to be susceptible to 

that.  I will hold off on changing antibiotics and reevaluate tomorrow.  There 

is also the possibility that she may go to the OR as per surgery











- Time


Time Spent with patient: 25-34 minutes


Medications reviewed and adjusted accordingly: Yes

## 2020-04-29 LAB
%HYPO/RBC NFR BLD AUTO: SLIGHT %
ABSOLUTE LYMPHOCYTES# (MANUAL): 2.3 10^3/UL (ref 0.5–4.7)
ABSOLUTE MONOCYTES # (MANUAL): 0.4 10^3/UL (ref 0.1–1.4)
ADD MANUAL DIFF: YES
ANION GAP SERPL CALC-SCNC: 5 MMOL/L (ref 5–19)
ANISOCYTOSIS BLD QL SMEAR: (no result)
BASOPHILS NFR BLD MANUAL: 1 % (ref 0–2)
BUN SERPL-MCNC: 8 MG/DL (ref 7–20)
CALCIUM: 8.6 MG/DL (ref 8.4–10.2)
CHLORIDE SERPL-SCNC: 105 MMOL/L (ref 98–107)
CO2 SERPL-SCNC: 28 MMOL/L (ref 22–30)
EOSINOPHIL NFR BLD MANUAL: 5 % (ref 0–6)
ERYTHROCYTE [DISTWIDTH] IN BLOOD BY AUTOMATED COUNT: 16.9 % (ref 11.5–14)
GLUCOSE SERPL-MCNC: 93 MG/DL (ref 75–110)
HCT VFR BLD CALC: 27.8 % (ref 36–47)
HGB BLD-MCNC: 9.1 G/DL (ref 12–15.5)
MCH RBC QN AUTO: 24 PG (ref 27–33.4)
MCHC RBC AUTO-ENTMCNC: 32.9 G/DL (ref 32–36)
MCV RBC AUTO: 73 FL (ref 80–97)
MONOCYTES % (MANUAL): 3 % (ref 3–13)
NRBC BLD AUTO-RTO: 1 /100 WBC
PLATELET # BLD: 415 10^3/UL (ref 150–450)
PLATELET COMMENT: ADEQUATE
POTASSIUM SERPL-SCNC: 4.3 MMOL/L (ref 3.6–5)
RBC # BLD AUTO: 3.8 10^6/UL (ref 3.72–5.28)
SEGMENTED NEUTROPHILS % (MAN): 73 % (ref 42–78)
TOTAL CELLS COUNTED BLD: 100
VARIANT LYMPHS NFR BLD MANUAL: 18 % (ref 13–45)
WBC # BLD AUTO: 12.6 10^3/UL (ref 4–10.5)

## 2020-04-29 RX ADMIN — DOCUSATE SODIUM SCH: 100 CAPSULE, LIQUID FILLED ORAL at 10:14

## 2020-04-29 RX ADMIN — CEFTRIAXONE SCH MLS/HR: 2 INJECTION, SOLUTION INTRAVENOUS at 10:15

## 2020-04-29 RX ADMIN — IBUPROFEN SCH MG: 800 TABLET, FILM COATED ORAL at 17:21

## 2020-04-29 RX ADMIN — HEPARIN SODIUM SCH UNIT: 5000 INJECTION, SOLUTION INTRAVENOUS; SUBCUTANEOUS at 14:21

## 2020-04-29 RX ADMIN — ESCITALOPRAM OXALATE SCH MG: 10 TABLET, FILM COATED ORAL at 10:14

## 2020-04-29 RX ADMIN — MORPHINE SULFATE PRN MG: 10 INJECTION INTRAMUSCULAR; INTRAVENOUS; SUBCUTANEOUS at 08:21

## 2020-04-29 RX ADMIN — INSULIN LISPRO SCH: 100 INJECTION, SOLUTION INTRAVENOUS; SUBCUTANEOUS at 08:20

## 2020-04-29 RX ADMIN — IBUPROFEN SCH MG: 800 TABLET, FILM COATED ORAL at 08:21

## 2020-04-29 RX ADMIN — FAMOTIDINE SCH MG: 10 INJECTION INTRAVENOUS at 10:14

## 2020-04-29 RX ADMIN — INSULIN LISPRO SCH: 100 INJECTION, SOLUTION INTRAVENOUS; SUBCUTANEOUS at 21:40

## 2020-04-29 RX ADMIN — INSULIN LISPRO SCH: 100 INJECTION, SOLUTION INTRAVENOUS; SUBCUTANEOUS at 15:58

## 2020-04-29 RX ADMIN — LISINOPRIL SCH MG: 10 TABLET ORAL at 10:14

## 2020-04-29 RX ADMIN — DOCUSATE SODIUM SCH: 100 CAPSULE, LIQUID FILLED ORAL at 17:03

## 2020-04-29 RX ADMIN — IBUPROFEN SCH MG: 800 TABLET, FILM COATED ORAL at 14:20

## 2020-04-29 RX ADMIN — HYDROCODONE BITARTRATE AND ACETAMINOPHEN PRN TAB: 10; 325 TABLET ORAL at 05:33

## 2020-04-29 RX ADMIN — HEPARIN SODIUM SCH: 5000 INJECTION, SOLUTION INTRAVENOUS; SUBCUTANEOUS at 05:01

## 2020-04-29 RX ADMIN — FAMOTIDINE SCH MG: 10 INJECTION INTRAVENOUS at 22:18

## 2020-04-29 RX ADMIN — INSULIN LISPRO SCH: 100 INJECTION, SOLUTION INTRAVENOUS; SUBCUTANEOUS at 14:42

## 2020-04-29 RX ADMIN — HYDROCODONE BITARTRATE AND ACETAMINOPHEN PRN TAB: 10; 325 TABLET ORAL at 14:20

## 2020-04-29 RX ADMIN — HEPARIN SODIUM SCH UNIT: 5000 INJECTION, SOLUTION INTRAVENOUS; SUBCUTANEOUS at 22:18

## 2020-04-29 NOTE — PDOC PROGRESS REPORT
Subjective


Progress Note for:: 04/29/20


Reason For Visit: 


SEPSIS,BUTTOCKS ABSCESS








Physical Exam


Vital Signs: 


                                        











Temp Pulse Resp BP Pulse Ox


 


 98.0 F   97   16   156/75 H  99 


 


 04/29/20 07:16  04/29/20 07:16  04/29/20 07:16  04/29/20 07:16  04/29/20 07:16








                                 Intake & Output











 04/28/20 04/29/20 04/30/20





 06:59 06:59 06:59


 


Intake Total 2120 1114 


 


Balance 2120 1114 


 


Weight 213.2 kg 208.5 kg 














Results


Laboratory Results: 


                                        





                                 04/29/20 05:45 





                                 04/29/20 05:45 





                                        











  04/29/20 04/29/20





  05:45 05:45


 


WBC   12.6 H


 


RBC   3.80


 


Hgb   9.1 L


 


Hct   27.8 L


 


MCV   73 L


 


MCH   24.0 L


 


MCHC   32.9


 


RDW   16.9 H


 


Plt Count   415


 


Seg Neutrophils %   Not Reportable


 


Sodium  138.3 


 


Potassium  4.3 


 


Chloride  105 


 


Carbon Dioxide  28 


 


Anion Gap  5 


 


BUN  8 


 


Creatinine  0.89 


 


Est GFR (African Amer)  > 60 


 


Glucose  93 


 


Calcium  8.6 








                                        





04/24/20 02:34   Blood   Blood Culture - Final


                            NO GROWTH IN 5 DAYS


04/24/20 14:00   Buttocks - Abscess   Gram Stain - Final


04/24/20 14:00   Buttocks - Abscess   Wound Culture - Final


                            Escherichia Coli


                            Prevotella Species


04/24/20 02:06   Blood   Blood Culture (PCR) - Final


                            Staphylococcus Species


04/24/20 02:06   Blood   Blood Culture - Final


                            Staphylococcus Hominis


04/24/20 02:06   Buttocks - Abscess   Gram Stain - Final


04/24/20 02:06   Buttocks - Abscess   Wound Culture - Final


                            Escherichia Coli


                            Enterococcus Faecalis(Group D)


                            Peptostreptococcus Species


                            Prevotella Species


                            Clostridium Sp.not Perfringens








Impressions: 


                                        





Extremity Ultrasound  04/24/20 05:34


IMPRESSION:


 


Moderate soft tissue edema/cellulitis of the right gluteal


region. Limitation.


 














Assessment & Plan





- Diagnosis


(1) Morbid obesity with BMI of 70 and over, adult


Is this a current diagnosis for this admission?: Yes   





(2) Necrotizing soft tissue infection


Is this a current diagnosis for this admission?: Yes   





- Plan Summary


Plan Summary: 





This is a 34-year-old female with a necrotizing soft tissue infection of the 

buttock and perineum.  The patient is status post wide debridement of all 

necrotic tissue.  Her wound continues to improve.  There is less fibrinous 

slough in the wound today.  There is no active purulence at present.  Her 

leukocytosis is improving.  Continue diet.  Continue antibiotics.  Out of bed.  

Continue dressing changes. Discharge planning.

## 2020-04-29 NOTE — PDOC PROGRESS REPORT
Subjective


Progress Note for:: 04/29/20


Subjective:: 





Follow-up ofI and D of abscess left buttock.  Patient says she feels better


c/o L ear pain


Reason For Visit: 


SEPSIS,BUTTOCKS ABSCESS








Physical Exam


Vital Signs: 


                                        











Temp Pulse Resp BP Pulse Ox


 


 98.0 F   97   16   156/75 H  99 


 


 04/29/20 07:16  04/29/20 07:16  04/29/20 07:16  04/29/20 07:16  04/29/20 07:16








                                 Intake & Output











 04/28/20 04/29/20 04/30/20





 06:59 06:59 06:59


 


Intake Total 2120 1114 


 


Balance 2120 1114 


 


Weight 213.2 kg 208.5 kg 











General appearance: PRESENT: no acute distress, morbidly obese, well-nourished


Head exam: PRESENT: atraumatic, normocephalic


Eye exam: PRESENT: conjunctiva pink, PERRLA.  ABSENT: scleral icterus


Ear exam: PRESENT: other - small skin tag L ear


Mouth exam: PRESENT: moist, tongue midline


Neck exam: ABSENT: carotid bruit, JVD, lymphadenopathy, thyromegaly


Respiratory exam: PRESENT: clear to auscultation viridiana.  ABSENT: rales, rhonchi, 

wheezes


Cardiovascular exam: PRESENT: RRR, +S1, +S2.  ABSENT: diastolic murmur, rubs, 

systolic murmur


Pulses: PRESENT: normal dorsalis pedis pul


Vascular exam: PRESENT: normal capillary refill


GI/Abdominal exam: PRESENT: normal bowel sounds, soft.  ABSENT: distended, 

guarding, mass, organolmegaly, rebound, tenderness


Rectal exam: PRESENT: deferred


Extremities exam: PRESENT: full ROM.  ABSENT: calf tenderness, clubbing, pedal 

edema


Musculoskeletal exam: PRESENT: other - R buttock wound s/p I and D


Neurological exam: PRESENT: alert, awake, oriented to person, oriented to place,

oriented to time, oriented to situation, CN II-XII grossly intact.  ABSENT: 

motor sensory deficit


Psychiatric exam: PRESENT: appropriate affect, normal mood.  ABSENT: homicidal 

ideation, suicidal ideation


Skin exam: PRESENT: dry, intact, warm.  ABSENT: cyanosis, rash





Results


Laboratory Results: 


                                        





                                 04/29/20 05:45 





                                 04/29/20 05:45 





                                        











  04/29/20 04/29/20





  05:45 05:45


 


WBC   12.6 H


 


RBC   3.80


 


Hgb   9.1 L


 


Hct   27.8 L


 


MCV   73 L


 


MCH   24.0 L


 


MCHC   32.9


 


RDW   16.9 H


 


Plt Count   415


 


Seg Neutrophils %   Not Reportable


 


Sodium  138.3 


 


Potassium  4.3 


 


Chloride  105 


 


Carbon Dioxide  28 


 


Anion Gap  5 


 


BUN  8 


 


Creatinine  0.89 


 


Est GFR (African Amer)  > 60 


 


Glucose  93 


 


Calcium  8.6 








                                        





04/24/20 02:34   Blood   Blood Culture - Final


                            NO GROWTH IN 5 DAYS


04/24/20 14:00   Buttocks - Abscess   Gram Stain - Final


04/24/20 14:00   Buttocks - Abscess   Wound Culture - Final


                            Escherichia Coli


                            Prevotella Species


04/24/20 02:06   Blood   Blood Culture (PCR) - Final


                            Staphylococcus Species


04/24/20 02:06   Blood   Blood Culture - Final


                            Staphylococcus Hominis


04/24/20 02:06   Buttocks - Abscess   Gram Stain - Final


04/24/20 02:06   Buttocks - Abscess   Wound Culture - Final


                            Escherichia Coli


                            Enterococcus Faecalis(Group D)


                            Peptostreptococcus Species


                            Prevotella Species


                            Clostridium Sp.not Perfringens








Impressions: 


                                        





Extremity Ultrasound  04/24/20 05:34


IMPRESSION:


 


Moderate soft tissue edema/cellulitis of the right gluteal


region. Limitation.


 














Assessment and Plan





- Diagnosis


(1) Morbid obesity with BMI of 70 and over, adult


Is this a current diagnosis for this admission?: Yes   





(2) Perirectal abscess


Is this a current diagnosis for this admission?: Yes   


Plan: 


Now status post surgical I&D by Dr. Smith.


Wound culture shows gram-negative rods gram-positive cocci, and gram-positive 

rods


Blood culture (1 of 4 bottles) shows gram-positive cocci in clusters. 


WBCs trending down





Surgery is consulted; wound care per their expertise.


Patient is admitted to the medical floor.


She has been empirically placed on IV vancomycin and Zosyn.


Antiemetics and analgesics as needed.


Discharge planning is consulted





4/27 Will change abx to Ceftriaxone





4/29 awaiting evaluation by surgery for possible intervention again today pat

ient remains n.p.o.








(3) Sepsis


Qualifiers: 


   Sepsis type: sepsis due to unspecified organism 


Is this a current diagnosis for this admission?: Yes   


Plan: 


Patient was septic on admission with a temperature of 101.7, tachycardia with a 

pulse rate of 133, leukocytosis with a white count of 29,000 and acute kidney 

injury.





4/28 Resolved








(4) Hypertension


Is this a current diagnosis for this admission?: Yes   


Plan: 


Continue home dose Lisinopril.


IV hydralazine as needed for blood pressure control.


Appropriate pain control.


Cardiac diet.








(5) Prediabetes


Is this a current diagnosis for this admission?: Yes   


Plan: 


A1c 6.6%.


Discussed with patient has previously been on metformin.  We will plan on 

resuming at discharge.


While admitted, will provide Accu-Cheks before meals and at bedtime with Humalog

for sliding scale coverage.


Registered dietitian is consulted.








- Plan Summary


Summary: 


Wound culture is yielding multiple organisms including gram-positive cocci in 

chains, Clostridium non-perfringens, E. coli and she had a blood culture that 

grew Staphylococcus in clusters.  Blood culture has been negative.  There was 

just 1 blood culture out of 4+ possibly contaminant.  Looking at a profile of 

antibiotic sensitivity including E. coli I think ceftriaxone is probably a good 

choice as this will also cover the multiple gram-positive organisms.  I will 

change antibiotics to ceftriaxone.  Her white count is down to 14,000





4/28 count is slightly elevated however patient remains clinically stable.  I 

did change to ceftriaxone yesterday.  Cultures do seem to be susceptible to 

that.  I will hold off on changing antibiotics and reevaluate tomorrow.  There 

is also the possibility that she may go to the OR as per surgery





4/29 WBC improved, continue same abx











- Time


Time Spent with patient: 25-34 minutes

## 2020-04-30 RX ADMIN — DOCUSATE SODIUM SCH: 100 CAPSULE, LIQUID FILLED ORAL at 17:26

## 2020-04-30 RX ADMIN — HEPARIN SODIUM SCH: 5000 INJECTION, SOLUTION INTRAVENOUS; SUBCUTANEOUS at 14:28

## 2020-04-30 RX ADMIN — FAMOTIDINE SCH MG: 10 INJECTION INTRAVENOUS at 09:52

## 2020-04-30 RX ADMIN — INSULIN LISPRO SCH: 100 INJECTION, SOLUTION INTRAVENOUS; SUBCUTANEOUS at 22:30

## 2020-04-30 RX ADMIN — HYDRALAZINE HYDROCHLORIDE PRN MG: 20 INJECTION INTRAMUSCULAR; INTRAVENOUS at 11:04

## 2020-04-30 RX ADMIN — CEFTRIAXONE SCH MLS/HR: 2 INJECTION, SOLUTION INTRAVENOUS at 09:51

## 2020-04-30 RX ADMIN — DOCUSATE SODIUM SCH: 100 CAPSULE, LIQUID FILLED ORAL at 09:52

## 2020-04-30 RX ADMIN — HYDRALAZINE HYDROCHLORIDE PRN MG: 20 INJECTION INTRAMUSCULAR; INTRAVENOUS at 00:36

## 2020-04-30 RX ADMIN — HEPARIN SODIUM SCH UNIT: 5000 INJECTION, SOLUTION INTRAVENOUS; SUBCUTANEOUS at 06:08

## 2020-04-30 RX ADMIN — LISINOPRIL SCH MG: 10 TABLET ORAL at 09:52

## 2020-04-30 RX ADMIN — IBUPROFEN SCH MG: 800 TABLET, FILM COATED ORAL at 09:51

## 2020-04-30 RX ADMIN — INSULIN LISPRO SCH: 100 INJECTION, SOLUTION INTRAVENOUS; SUBCUTANEOUS at 11:06

## 2020-04-30 RX ADMIN — MORPHINE SULFATE PRN MG: 10 INJECTION INTRAMUSCULAR; INTRAVENOUS; SUBCUTANEOUS at 00:14

## 2020-04-30 RX ADMIN — ESCITALOPRAM OXALATE SCH MG: 10 TABLET, FILM COATED ORAL at 09:52

## 2020-04-30 RX ADMIN — INSULIN LISPRO SCH: 100 INJECTION, SOLUTION INTRAVENOUS; SUBCUTANEOUS at 07:28

## 2020-04-30 RX ADMIN — MORPHINE SULFATE PRN MG: 10 INJECTION INTRAMUSCULAR; INTRAVENOUS; SUBCUTANEOUS at 06:06

## 2020-04-30 RX ADMIN — MORPHINE SULFATE PRN MG: 10 INJECTION INTRAMUSCULAR; INTRAVENOUS; SUBCUTANEOUS at 22:33

## 2020-04-30 RX ADMIN — HEPARIN SODIUM SCH UNIT: 5000 INJECTION, SOLUTION INTRAVENOUS; SUBCUTANEOUS at 22:39

## 2020-04-30 RX ADMIN — HYDROCODONE BITARTRATE AND ACETAMINOPHEN PRN TAB: 10; 325 TABLET ORAL at 09:52

## 2020-04-30 RX ADMIN — IBUPROFEN SCH MG: 800 TABLET, FILM COATED ORAL at 17:27

## 2020-04-30 RX ADMIN — IBUPROFEN SCH MG: 800 TABLET, FILM COATED ORAL at 12:36

## 2020-04-30 RX ADMIN — HYDROCODONE BITARTRATE AND ACETAMINOPHEN PRN TAB: 10; 325 TABLET ORAL at 20:18

## 2020-04-30 RX ADMIN — INSULIN LISPRO SCH: 100 INJECTION, SOLUTION INTRAVENOUS; SUBCUTANEOUS at 16:41

## 2020-04-30 RX ADMIN — FAMOTIDINE SCH MG: 20 TABLET, FILM COATED ORAL at 22:33

## 2020-04-30 NOTE — PDOC PROGRESS REPORT
Subjective


Progress Note for:: 04/30/20


Subjective:: 





Follow-up ofI and D of abscess left buttock.  Patient says she feels better


she has no new complaints today


Reason For Visit: 


SEPSIS,BUTTOCKS ABSCESS








Physical Exam


Vital Signs: 


                                        











Temp Pulse Resp BP Pulse Ox


 


 97.9 F   94   18   156/111 H  98 


 


 04/30/20 10:41  04/30/20 10:41  04/30/20 10:41  04/30/20 10:41  04/30/20 10:41








                                 Intake & Output











 04/29/20 04/30/20 05/01/20





 06:59 06:59 06:59


 


Intake Total 1114 1665 740


 


Balance 1114 1665 740


 


Weight 208.5 kg 208.5 kg 











General appearance: PRESENT: no acute distress, morbidly obese, well-developed


Head exam: PRESENT: atraumatic, normocephalic


Eye exam: PRESENT: conjunctiva pink, EOMI, PERRLA.  ABSENT: scleral icterus


Ear exam: PRESENT: normal external ear exam


Mouth exam: PRESENT: moist, tongue midline


Neck exam: ABSENT: carotid bruit, JVD, lymphadenopathy, thyromegaly


Respiratory exam: PRESENT: clear to auscultation viridiana.  ABSENT: rales, rhonchi, 

wheezes


Cardiovascular exam: PRESENT: RRR, +S1, +S2.  ABSENT: diastolic murmur, rubs, 

systolic murmur


Pulses: PRESENT: normal dorsalis pedis pul


Vascular exam: PRESENT: normal capillary refill


GI/Abdominal exam: PRESENT: normal bowel sounds, soft.  ABSENT: distended, 

guarding, mass, organolmegaly, rebound, tenderness


Rectal exam: PRESENT: deferred


Extremities exam: PRESENT: full ROM.  ABSENT: calf tenderness, clubbing, pedal 

edema


Musculoskeletal exam: PRESENT: other - Dressing over R ischium


Neurological exam: PRESENT: alert, awake, oriented to person, oriented to place,

oriented to time, oriented to situation, CN II-XII grossly intact.  ABSENT: 

motor sensory deficit


Psychiatric exam: PRESENT: appropriate affect, normal mood.  ABSENT: homicidal 

ideation, suicidal ideation


Skin exam: PRESENT: dry, intact, warm.  ABSENT: cyanosis, rash





Results


Laboratory Results: 


                                        





                                 04/29/20 05:45 





                                 04/29/20 05:45 








Impressions: 


                                        





Extremity Ultrasound  04/24/20 05:34


IMPRESSION:


 


Moderate soft tissue edema/cellulitis of the right gluteal


region. Limitation.


 














Assessment and Plan





- Diagnosis


(1) Morbid obesity with BMI of 70 and over, adult


Is this a current diagnosis for this admission?: Yes   





(2) Perirectal abscess


Is this a current diagnosis for this admission?: Yes   





(3) Sepsis


Qualifiers: 


   Sepsis type: sepsis due to unspecified organism 


Is this a current diagnosis for this admission?: Yes   





(4) Hypertension


Is this a current diagnosis for this admission?: Yes   





(5) Prediabetes


Is this a current diagnosis for this admission?: Yes   





- Plan Summary


Summary: 


Wound culture is yielding multiple organisms including gram-positive cocci in 

chains, Clostridium non-perfringens, E. coli and she had a blood culture that 

grew Staphylococcus in clusters.  Blood culture has been negative.  There was 

just 1 blood culture out of 4+ possibly contaminant.  Looking at a profile of 

antibiotic sensitivity including E. coli I think ceftriaxone is probably a good 

choice as this will also cover the multiple gram-positive organisms.  I will 

change antibiotics to ceftriaxone.  Her white count is down to 14,000





4/28 count is slightly elevated however patient remains clinically stable.  I 

did change to ceftriaxone yesterday.  Cultures do seem to be susceptible to 

that.  I will hold off on changing antibiotics and reevaluate tomorrow.  There 

is also the possibility that she may go to the OR as per surgery





4/29 WBC improved, continue same abx





4/30Status post wide debridement of ischial abscess.  Wound apparently doing 

better and no need for wound VAC or for the OR intervention at this time.  

Patient will need to go to rehab as she will need wound care which she cannot 

provide a currently provided by family.  She is still on IV antibiotics although

she has improved with a white count coming down from 29.2-12.6.  Wound culture 

is yielding multiple organisms.  Patient is currently on ceftriaxone but I 

believe this can be changed to oral antibiotics in a few days and probably 

discharge to rehab early next week s











- Time


Anticipated discharge: SNF


Within: within 72 hours





- Inpatient Certification


Based on my medical assessment, after consideration of the patient's 

comorbidities, presenting symptoms, or acuity I expect that the services needed 

warrant INPATIENT care.: Yes


Medical Necessity: Need for IV Antibiotics

## 2020-04-30 NOTE — PDOC PROGRESS REPORT
Subjective


Progress Note for:: 04/30/20


Subjective:: 





feels better


Reason For Visit: 


SEPSIS,BUTTOCKS ABSCESS








Physical Exam


Vital Signs: 


                                        











Temp Pulse Resp BP Pulse Ox


 


 98.2 F   104 H  18   175/111 H  99 


 


 04/30/20 08:00  04/30/20 08:00  04/30/20 08:00  04/30/20 08:00  04/30/20 08:00








                                 Intake & Output











 04/29/20 04/30/20 05/01/20





 06:59 06:59 06:59


 


Intake Total 1114 1665 


 


Balance 1114 1665 


 


Weight 208.5 kg 208.5 kg 











General appearance: PRESENT: no acute distress, morbidly obese, obese


Head exam: PRESENT: normocephalic


Eye exam: PRESENT: EOMI


Ear exam: PRESENT: normal external ear exam


Mouth exam: PRESENT: moist


Neck exam: PRESENT: full ROM


Respiratory exam: PRESENT: clear to auscultation viridiana


Cardiovascular exam: PRESENT: RRR


Pulses: PRESENT: normal radial pulses, normal femoral pulses


Vascular exam: PRESENT: normal capillary refill


Breast: PRESENT: Normal


Rectal exam: PRESENT: other - right ischial wound looks better granulation 

tissue no sig necrosis no undrained loculations


Extremities exam: PRESENT: full ROM


Musculoskeletal exam: PRESENT: ambulatory


Neurological exam: PRESENT: alert, awake, oriented to place


Psychiatric exam: PRESENT: appropriate affect


Skin exam: PRESENT: dry





Results


Laboratory Results: 


                                        





                                 04/29/20 05:45 





                                 04/29/20 05:45 








Impressions: 


                                        





Extremity Ultrasound  04/24/20 05:34


IMPRESSION:


 


Moderate soft tissue edema/cellulitis of the right gluteal


region. Limitation.


 














Assessment & Plan





- Plan Summary


Plan Summary: 





s/p wide debridement of 


ischial abscess


wound doing better


prob not appropriated for vac


would cont dressing changes wet to dry


ok from surgery standpt for discharge to [facility].

## 2020-05-01 LAB
ADD MANUAL DIFF: NO
ANION GAP SERPL CALC-SCNC: 5 MMOL/L (ref 5–19)
BASOPHILS # BLD AUTO: 0.1 10^3/UL (ref 0–0.2)
BASOPHILS NFR BLD AUTO: 0.8 % (ref 0–2)
BUN SERPL-MCNC: 11 MG/DL (ref 7–20)
CALCIUM: 8.6 MG/DL (ref 8.4–10.2)
CHLORIDE SERPL-SCNC: 105 MMOL/L (ref 98–107)
CO2 SERPL-SCNC: 27 MMOL/L (ref 22–30)
EOSINOPHIL # BLD AUTO: 0.7 10^3/UL (ref 0–0.6)
EOSINOPHIL NFR BLD AUTO: 7.2 % (ref 0–6)
ERYTHROCYTE [DISTWIDTH] IN BLOOD BY AUTOMATED COUNT: 17 % (ref 11.5–14)
GLUCOSE SERPL-MCNC: 104 MG/DL (ref 75–110)
HCT VFR BLD CALC: 27.6 % (ref 36–47)
HGB BLD-MCNC: 9.2 G/DL (ref 12–15.5)
LYMPHOCYTES # BLD AUTO: 2.1 10^3/UL (ref 0.5–4.7)
LYMPHOCYTES NFR BLD AUTO: 23.2 % (ref 13–45)
MCH RBC QN AUTO: 24.3 PG (ref 27–33.4)
MCHC RBC AUTO-ENTMCNC: 33.4 G/DL (ref 32–36)
MCV RBC AUTO: 73 FL (ref 80–97)
MONOCYTES # BLD AUTO: 0.5 10^3/UL (ref 0.1–1.4)
MONOCYTES NFR BLD AUTO: 5 % (ref 3–13)
NEUTROPHILS # BLD AUTO: 5.8 10^3/UL (ref 1.7–8.2)
NEUTS SEG NFR BLD AUTO: 63.8 % (ref 42–78)
PLATELET # BLD: 485 10^3/UL (ref 150–450)
POTASSIUM SERPL-SCNC: 4.4 MMOL/L (ref 3.6–5)
RBC # BLD AUTO: 3.8 10^6/UL (ref 3.72–5.28)
TOTAL CELLS COUNTED % (AUTO): 100 %
WBC # BLD AUTO: 9.1 10^3/UL (ref 4–10.5)

## 2020-05-01 RX ADMIN — INSULIN LISPRO SCH: 100 INJECTION, SOLUTION INTRAVENOUS; SUBCUTANEOUS at 21:27

## 2020-05-01 RX ADMIN — IBUPROFEN SCH MG: 800 TABLET, FILM COATED ORAL at 17:23

## 2020-05-01 RX ADMIN — HYDROCODONE BITARTRATE AND ACETAMINOPHEN PRN TAB: 10; 325 TABLET ORAL at 08:23

## 2020-05-01 RX ADMIN — INSULIN LISPRO SCH: 100 INJECTION, SOLUTION INTRAVENOUS; SUBCUTANEOUS at 07:50

## 2020-05-01 RX ADMIN — IBUPROFEN SCH MG: 800 TABLET, FILM COATED ORAL at 08:22

## 2020-05-01 RX ADMIN — INSULIN LISPRO SCH: 100 INJECTION, SOLUTION INTRAVENOUS; SUBCUTANEOUS at 11:27

## 2020-05-01 RX ADMIN — DOCUSATE SODIUM SCH: 100 CAPSULE, LIQUID FILLED ORAL at 09:36

## 2020-05-01 RX ADMIN — IBUPROFEN SCH MG: 800 TABLET, FILM COATED ORAL at 11:21

## 2020-05-01 RX ADMIN — FAMOTIDINE SCH MG: 20 TABLET, FILM COATED ORAL at 21:33

## 2020-05-01 RX ADMIN — HYDROCODONE BITARTRATE AND ACETAMINOPHEN PRN TAB: 10; 325 TABLET ORAL at 13:11

## 2020-05-01 RX ADMIN — MORPHINE SULFATE PRN MG: 10 INJECTION INTRAMUSCULAR; INTRAVENOUS; SUBCUTANEOUS at 06:43

## 2020-05-01 RX ADMIN — HEPARIN SODIUM SCH UNIT: 5000 INJECTION, SOLUTION INTRAVENOUS; SUBCUTANEOUS at 13:12

## 2020-05-01 RX ADMIN — HEPARIN SODIUM SCH: 5000 INJECTION, SOLUTION INTRAVENOUS; SUBCUTANEOUS at 05:27

## 2020-05-01 RX ADMIN — HYDRALAZINE HYDROCHLORIDE PRN MG: 20 INJECTION INTRAMUSCULAR; INTRAVENOUS at 03:50

## 2020-05-01 RX ADMIN — MORPHINE SULFATE PRN MG: 10 INJECTION INTRAMUSCULAR; INTRAVENOUS; SUBCUTANEOUS at 18:43

## 2020-05-01 RX ADMIN — ESCITALOPRAM OXALATE SCH MG: 10 TABLET, FILM COATED ORAL at 09:36

## 2020-05-01 RX ADMIN — LISINOPRIL SCH MG: 10 TABLET ORAL at 09:36

## 2020-05-01 RX ADMIN — DOCUSATE SODIUM SCH: 100 CAPSULE, LIQUID FILLED ORAL at 17:23

## 2020-05-01 RX ADMIN — FAMOTIDINE SCH MG: 20 TABLET, FILM COATED ORAL at 09:36

## 2020-05-01 RX ADMIN — HEPARIN SODIUM SCH UNIT: 5000 INJECTION, SOLUTION INTRAVENOUS; SUBCUTANEOUS at 21:32

## 2020-05-01 RX ADMIN — CEFTRIAXONE SCH MLS/HR: 2 INJECTION, SOLUTION INTRAVENOUS at 09:36

## 2020-05-01 RX ADMIN — INSULIN LISPRO SCH: 100 INJECTION, SOLUTION INTRAVENOUS; SUBCUTANEOUS at 17:00

## 2020-05-01 RX ADMIN — MORPHINE SULFATE PRN MG: 10 INJECTION INTRAMUSCULAR; INTRAVENOUS; SUBCUTANEOUS at 23:24

## 2020-05-01 NOTE — PDOC PROGRESS REPORT
Subjective


Progress Note for:: 05/01/20


Reason For Visit: 


SEPSIS,BUTTOCKS ABSCESS








Physical Exam


Vital Signs: 


                                        











Temp Pulse Resp BP Pulse Ox


 


 97.9 F   100   18   153/83 H  99 


 


 05/01/20 10:40  05/01/20 10:40  05/01/20 10:40  05/01/20 10:40  05/01/20 10:40








                                 Intake & Output











 04/30/20 05/01/20 05/02/20





 06:59 06:59 06:59


 


Intake Total 1665 2325 


 


Balance 1665 2325 


 


Weight 208.5 kg 280.5 kg 











General appearance: PRESENT: no acute distress, obese, well-developed, well-

nourished


Head exam: PRESENT: atraumatic, normocephalic


Eye exam: PRESENT: conjunctiva pink


Mouth exam: PRESENT: moist


Respiratory exam: PRESENT: clear to auscultation viridiana.  ABSENT: rales, rhonchi, 

wheezes


GI/Abdominal exam: PRESENT: normal bowel sounds, soft.  ABSENT: distended, 

guarding, mass, organolmegaly, rebound, tenderness


Extremities exam: PRESENT: pedal edema


Neurological exam: PRESENT: alert, awake, oriented to person, oriented to place,

oriented to time, oriented to situation


Skin exam: PRESENT: dry, warm, other - Multiple extensive surgical wounds along 

right leg, healing





Results


Laboratory Results: 


                                        





                                 05/01/20 05:49 





                                 05/01/20 05:49 





                                        











  05/01/20 05/01/20





  05:49 05:49


 


WBC  9.1 


 


RBC  3.80 


 


Hgb  9.2 L 


 


Hct  27.6 L 


 


MCV  73 L 


 


MCH  24.3 L 


 


MCHC  33.4 


 


RDW  17.0 H 


 


Plt Count  485 H 


 


Seg Neutrophils %  63.8 


 


Sodium   137.0


 


Potassium   4.4


 


Chloride   105


 


Carbon Dioxide   27


 


Anion Gap   5


 


BUN   11


 


Creatinine   0.89


 


Est GFR (African Amer)   > 60


 


Glucose   104


 


Calcium   8.6








                                        





04/26/20 04:49   Blood   Blood Culture - Final


                            NO GROWTH IN 5 DAYS


04/26/20 04:39   Blood   Blood Culture - Final


                            NO GROWTH IN 5 DAYS








Impressions: 


                                        





Extremity Ultrasound  04/24/20 05:34


IMPRESSION:


 


Moderate soft tissue edema/cellulitis of the right gluteal


region. Limitation.


 














Assessment and Plan





- Diagnosis


(1) Perirectal abscess


Is this a current diagnosis for this admission?: Yes   


Plan: 


Now status post surgical I&D by Dr. Smith.


Wound culture shows gram-negative rods gram-positive cocci, and gram-positive 

rods


Blood culture (1 of 4 bottles) shows gram-positive cocci in clusters. 


WBCs trending down





Surgery is consulted; wound care per their expertise.


Patient is admitted to the medical floor.


She has been empirically placed on IV vancomycin and Zosyn.


Antiemetics and analgesics as needed.


Discharge planning is consulted





4/27 Will change abx to Ceftriaxone





4/29 awaiting evaluation by surgery for possible intervention again today pa

vika remains n.p.o.





5/1/2020


Her extensive wounds will require a great deal of care after discharge.  She 

will be unable to care for her own wounds to her severe obesity and the 

extensive nature of her wounds.  She will need to go to nursing facility but we 

have yet to find one that accepts her.


Continued on ceftriaxone








(2) Hypertension


Is this a current diagnosis for this admission?: Yes   


Plan: 


Continue home dose Lisinopril.


IV hydralazine as needed for blood pressure control.


Appropriate pain control.


Cardiac diet.





5/1/2020


BP uncontrolled


IV hydralazine stopped, we can control her blood pressure with orals rather t

watson IV PRN medications that can cause abrupt unpredictable drops


Increase lisinopril dose


Start chlorthalidone








(3) Morbid obesity with BMI of 70 and over, adult


Is this a current diagnosis for this admission?: Yes   


Plan: 





Counseled extensively on weight loss


Recommended vegan diet which she is interested in starting








(4) Necrotizing soft tissue infection


Is this a current diagnosis for this admission?: Yes   





(5) Prediabetes


Is this a current diagnosis for this admission?: Yes   





(6) Sepsis


Qualifiers: 


   Sepsis type: sepsis due to unspecified organism   Sepsis acute organ 

dysfunction status: unspecified   Qualified Code(s): A41.9 - Sepsis, unspecified

organism   


Is this a current diagnosis for this admission?: Yes   


Plan: 


Patient was septic on admission with a temperature of 101.7, tachycardia with a 

pulse rate of 133, leukocytosis with a white count of 29,000 and acute kidney 

injury.





4/28 Resolved








(7) Depression


Is this a current diagnosis for this admission?: Yes   





- Plan Summary


Summary: 


Wound culture is yielding multiple organisms including gram-positive cocci in 

chains, Clostridium non-perfringens, E. coli and she had a blood culture that 

grew Staphylococcus in clusters.  Blood culture has been negative.  There was 

just 1 blood culture out of 4+ possibly contaminant.  Looking at a profile of 

antibiotic sensitivity including E. coli I think ceftriaxone is probably a good 

choice as this will also cover the multiple gram-positive organisms.  I will 

change antibiotics to ceftriaxone.  Her white count is down to 14,000





4/28 count is slightly elevated however patient remains clinically stable.  I 

did change to ceftriaxone yesterday.  Cultures do seem to be susceptible to 

that.  I will hold off on changing antibiotics and reevaluate tomorrow.  There 

is also the possibility that she may go to the OR as per surgery





4/29 WBC improved, continue same abx





4/30Status post wide debridement of ischial abscess.  Wound apparently doing 

better and no need for wound VAC or for the OR intervention at this time.  

Patient will need to go to rehab as she will need wound care which she cannot 

provide a currently provided by family.  She is still on IV antibiotics although

she has improved with a white count coming down from 29.2-12.6.  Wound culture 

is yielding multiple organisms.  Patient is currently on ceftriaxone but I 

believe this can be changed to oral antibiotics in a few days and probably d

ischarge to rehab early next week s











- Time


Time Spent with patient: 15-24 minutes


Medications reviewed and adjusted accordingly: Yes


Anticipated discharge: SNF


Within: within 48 hours





- Inpatient Certification


Based on my medical assessment, after consideration of the patient's 

comorbidities, presenting symptoms, or acuity I expect that the services needed 

warrant INPATIENT care.: Yes


I certify that my determination is in accordance with my understanding of 

Medicare's requirements for reasonable and necessary INPATIENT services [42 CFR 

412.3e].: Yes


Medical Necessity: Significant Comorbidiites Make Outpatient Treatment Too 

Risky, Need Close Monitoring Due to Risk of Patient Decompensation, Need for IV 

Antibiotics, Risk of Complication if Not Cared For in Hospital, Risk of 

Diagnosis Which Will Require Inpatient Eval/Care/Monitoring

## 2020-05-02 RX ADMIN — IBUPROFEN SCH MG: 800 TABLET, FILM COATED ORAL at 12:11

## 2020-05-02 RX ADMIN — DOCUSATE SODIUM SCH: 100 CAPSULE, LIQUID FILLED ORAL at 17:51

## 2020-05-02 RX ADMIN — ESCITALOPRAM OXALATE SCH MG: 10 TABLET, FILM COATED ORAL at 11:45

## 2020-05-02 RX ADMIN — CEFTRIAXONE SCH MLS/HR: 2 INJECTION, SOLUTION INTRAVENOUS at 11:46

## 2020-05-02 RX ADMIN — MORPHINE SULFATE PRN MG: 10 INJECTION INTRAMUSCULAR; INTRAVENOUS; SUBCUTANEOUS at 07:46

## 2020-05-02 RX ADMIN — METOPROLOL TARTRATE SCH MG: 25 TABLET, FILM COATED ORAL at 11:37

## 2020-05-02 RX ADMIN — INSULIN LISPRO SCH: 100 INJECTION, SOLUTION INTRAVENOUS; SUBCUTANEOUS at 21:42

## 2020-05-02 RX ADMIN — OXYCODONE AND ACETAMINOPHEN PRN TAB: 5; 325 TABLET ORAL at 12:10

## 2020-05-02 RX ADMIN — IBUPROFEN SCH MG: 800 TABLET, FILM COATED ORAL at 17:35

## 2020-05-02 RX ADMIN — IBUPROFEN SCH MG: 800 TABLET, FILM COATED ORAL at 07:46

## 2020-05-02 RX ADMIN — INSULIN LISPRO SCH: 100 INJECTION, SOLUTION INTRAVENOUS; SUBCUTANEOUS at 07:35

## 2020-05-02 RX ADMIN — CHLORTHALIDONE SCH MG: 25 TABLET ORAL at 11:38

## 2020-05-02 RX ADMIN — DOCUSATE SODIUM SCH: 100 CAPSULE, LIQUID FILLED ORAL at 12:06

## 2020-05-02 RX ADMIN — HEPARIN SODIUM SCH: 5000 INJECTION, SOLUTION INTRAVENOUS; SUBCUTANEOUS at 05:05

## 2020-05-02 RX ADMIN — HEPARIN SODIUM SCH UNIT: 5000 INJECTION, SOLUTION INTRAVENOUS; SUBCUTANEOUS at 21:15

## 2020-05-02 RX ADMIN — INSULIN LISPRO SCH: 100 INJECTION, SOLUTION INTRAVENOUS; SUBCUTANEOUS at 16:36

## 2020-05-02 RX ADMIN — MORPHINE SULFATE PRN MG: 10 INJECTION INTRAMUSCULAR; INTRAVENOUS; SUBCUTANEOUS at 18:44

## 2020-05-02 RX ADMIN — METOPROLOL TARTRATE SCH MG: 25 TABLET, FILM COATED ORAL at 21:15

## 2020-05-02 RX ADMIN — MORPHINE SULFATE PRN MG: 10 INJECTION INTRAMUSCULAR; INTRAVENOUS; SUBCUTANEOUS at 04:27

## 2020-05-02 RX ADMIN — FAMOTIDINE SCH MG: 20 TABLET, FILM COATED ORAL at 11:45

## 2020-05-02 RX ADMIN — LISINOPRIL SCH MG: 10 TABLET ORAL at 11:45

## 2020-05-02 RX ADMIN — HEPARIN SODIUM SCH UNIT: 5000 INJECTION, SOLUTION INTRAVENOUS; SUBCUTANEOUS at 14:48

## 2020-05-02 RX ADMIN — INSULIN LISPRO SCH: 100 INJECTION, SOLUTION INTRAVENOUS; SUBCUTANEOUS at 11:33

## 2020-05-02 RX ADMIN — FAMOTIDINE SCH MG: 20 TABLET, FILM COATED ORAL at 21:15

## 2020-05-02 RX ADMIN — OXYCODONE AND ACETAMINOPHEN PRN TAB: 5; 325 TABLET ORAL at 21:44

## 2020-05-02 NOTE — PDOC PROGRESS REPORT
Subjective


Progress Note for:: 05/02/20


Subjective:: 





5/2/2020


Patient overall doing quite well today.  She is having a great deal of pain 

during her dressing changes but otherwise her pain is well controlled.  We will 

start transitioning her to oral pain medications rather than IV and I 

recommended that these be given her approximately 30 minutes prior to her 

dressing change.  She is interested in starting a vegan diet and we spent a 

great deal of time talking about this yesterday.  She has no new complaints.  

Surgery has signed off.  We are waiting for her to be accepted at a nursing 

facility for wound care.


Reason For Visit: 


SEPSIS,BUTTOCKS ABSCESS








Physical Exam


Vital Signs: 


                                        











Temp Pulse Resp BP Pulse Ox


 


 97.3 F   94   16   149/72 H  99 


 


 05/02/20 12:00  05/02/20 12:00  05/02/20 12:00  05/02/20 12:00  05/02/20 12:00








                                 Intake & Output











 05/01/20 05/02/20 05/03/20





 06:59 06:59 06:59


 


Intake Total 2325 1840 


 


Balance 2325 1840 


 


Weight 280.5 kg 208.5 kg 











General appearance: PRESENT: no acute distress, morbidly obese, well-developed, 

well-nourished


Head exam: PRESENT: atraumatic, normocephalic


Eye exam: PRESENT: conjunctiva pink


Mouth exam: PRESENT: moist


Respiratory exam: PRESENT: clear to auscultation viridiana.  ABSENT: rales, rhonchi, 

wheezes


Cardiovascular exam: PRESENT: RRR.  ABSENT: diastolic murmur, rubs, systolic 

murmur


GI/Abdominal exam: PRESENT: normal bowel sounds, soft.  ABSENT: distended, 

guarding, mass, organolmegaly, rebound, tenderness


Neurological exam: PRESENT: alert, awake, oriented to person, oriented to place,

oriented to time, oriented to situation


Psychiatric exam: PRESENT: appropriate affect, normal mood


Skin exam: PRESENT: dry, warm, other - Extensive surgical wounds clean and 

healing





Results


Laboratory Results: 


                                        





                                 05/01/20 05:49 





                                 05/01/20 05:49 








Impressions: 


                                        





Extremity Ultrasound  04/24/20 05:34


IMPRESSION:


 


Moderate soft tissue edema/cellulitis of the right gluteal


region. Limitation.


 














Assessment and Plan





- Diagnosis


(1) Perirectal abscess


Is this a current diagnosis for this admission?: Yes   


Plan: 


Now status post surgical I&D by Dr. Smith.


Wound culture shows gram-negative rods gram-positive cocci, and gram-positive 

rods


Blood culture (1 of 4 bottles) shows gram-positive cocci in clusters. 


WBCs trending down





Surgery is consulted; wound care per their expertise.


Patient is admitted to the medical floor.


She has been empirically placed on IV vancomycin and Zosyn.


Antiemetics and analgesics as needed.


Discharge planning is consulted





4/27 Will change abx to Ceftriaxone





4/29 awaiting evaluation by surgery for possible intervention again today 

patient remains n.p.o.





5/1/2020


Her extensive wounds will require a great deal of care after discharge.  She 

will be unable to care for her own wounds to her severe obesity and the 

extensive nature of her wounds.  She will need to go to nursing facility but we 

have yet to find one that accepts her.


Continued on ceftriaxone





5/2/2020


Pain seems to be only uncontrolled during dressing changes.  Transition oral 

narcotics and off of IV options.


Surgery has signed off


Needs to go to SNF for wound care, not accepted yet








(2) Hypertension


Is this a current diagnosis for this admission?: Yes   


Plan: 


Continue home dose Lisinopril.


IV hydralazine as needed for blood pressure control.


Appropriate pain control.


Cardiac diet.





5/1/2020


BP uncontrolled


IV hydralazine stopped, we can control her blood pressure with orals rather 

than IV PRN medications that can cause abrupt unpredictable drops


Increase lisinopril dose


Start chlorthalidone





5/2/2020


Uncontrolled, increased meds, added lisinopril








(3) Morbid obesity with BMI of 70 and over, adult


Is this a current diagnosis for this admission?: Yes   





(4) Necrotizing soft tissue infection


Is this a current diagnosis for this admission?: Yes   





(5) Prediabetes


Is this a current diagnosis for this admission?: Yes   





(6) Sepsis


Qualifiers: 


   Sepsis type: sepsis due to unspecified organism   Sepsis acute organ 

dysfunction status: unspecified   Qualified Code(s): A41.9 - Sepsis, unspecified

organism   


Is this a current diagnosis for this admission?: Yes   





(7) Depression


Is this a current diagnosis for this admission?: Yes   





- Plan Summary


Summary: 


Wound culture is yielding multiple organisms including gram-positive cocci in 

chains, Clostridium non-perfringens, E. coli and she had a blood culture that 

grew Staphylococcus in clusters.  Blood culture has been negative.  There was 

just 1 blood culture out of 4+ possibly contaminant.  Looking at a profile of 

antibiotic sensitivity including E. coli I think ceftriaxone is probably a good 

choice as this will also cover the multiple gram-positive organisms.  I will 

change antibiotics to ceftriaxone.  Her white count is down to 14,000





4/28 count is slightly elevated however patient remains clinically stable.  I 

did change to ceftriaxone yesterday.  Cultures do seem to be susceptible to 

that.  I will hold off on changing antibiotics and reevaluate tomorrow.  There 

is also the possibility that she may go to the OR as per surgery





4/29 WBC improved, continue same abx





4/30Status post wide debridement of ischial abscess.  Wound apparently doing 

better and no need for wound VAC or for the OR intervention at this time.  

Patient will need to go to rehab as she will need wound care which she cannot 

provide a currently provided by family.  She is still on IV antibiotics although

she has improved with a white count coming down from 29.2-12.6.  Wound culture 

is yielding multiple organisms.  Patient is currently on ceftriaxone but I 

believe this can be changed to oral antibiotics in a few days and probably 

discharge to rehab early next week s











- Time


Time Spent with patient: 15-24 minutes


Medications reviewed and adjusted accordingly: Yes


Anticipated discharge: SNF


Within: within 48 hours





- Inpatient Certification


Based on my medical assessment, after consideration of the patient's 

comorbidities, presenting symptoms, or acuity I expect that the services needed 

warrant INPATIENT care.: Yes


I certify that my determination is in accordance with my understanding of 

Medicare's requirements for reasonable and necessary INPATIENT services [42 CFR 

412.3e].: Yes


Medical Necessity: Significant Comorbidiites Make Outpatient Treatment Too 

Risky, Need Close Monitoring Due to Risk of Patient Decompensation, Risk of 

Complication if Not Cared For in Hospital, Risk of Diagnosis Which Will Require 

Inpatient Eval/Care/Monitoring

## 2020-05-02 NOTE — PDOC PROGRESS REPORT
Subjective


Reason For Visit: 


SEPSIS,BUTTOCKS ABSCESS








Physical Exam


Vital Signs: 


                                        











Temp Pulse Resp BP Pulse Ox


 


 98.0 F   92   22 H  152/93 H  100 


 


 05/02/20 08:00  05/02/20 08:00  05/02/20 08:00  05/02/20 08:00  05/02/20 08:00








                                 Intake & Output











 05/01/20 05/02/20 05/03/20





 06:59 06:59 06:59


 


Intake Total 2325 1840 


 


Balance 2325 1840 


 


Weight 280.5 kg 208.5 kg 














Results


Laboratory Results: 


                                        





                                 05/01/20 05:49 





                                 05/01/20 05:49 





                                        





04/26/20 04:49   Blood   Blood Culture - Final


                            NO GROWTH IN 5 DAYS


04/26/20 04:39   Blood   Blood Culture - Final


                            NO GROWTH IN 5 DAYS








Impressions: 


                                        





Extremity Ultrasound  04/24/20 05:34


IMPRESSION:


 


Moderate soft tissue edema/cellulitis of the right gluteal


region. Limitation.


 














Assessment & Plan





- Diagnosis


(1) Morbid obesity with BMI of 70 and over, adult


Is this a current diagnosis for this admission?: Yes   





(2) Necrotizing soft tissue infection


Is this a current diagnosis for this admission?: Yes   





- Plan Summary


Plan Summary: 





This is a 34-year-old female with a severe necrotizing soft tissue infection of 

the buttock.  Her wound appears clean today.  She is afebrile.  Plan is for 

skilled nursing facility placement to assist with dressing changes.  Continue 

with damp to dry dressing changes with packing twice daily.  No further surgical

intervention is necessary at this time.  The patient should follow-up with 

[facility] in the next 2 to 3 weeks for a wound check.  Surgery will

sign off at this time.  Please renotify with any questions or concerns.

## 2020-05-03 RX ADMIN — INSULIN LISPRO SCH: 100 INJECTION, SOLUTION INTRAVENOUS; SUBCUTANEOUS at 16:37

## 2020-05-03 RX ADMIN — CEFTRIAXONE SCH MLS/HR: 2 INJECTION, SOLUTION INTRAVENOUS at 10:17

## 2020-05-03 RX ADMIN — METOPROLOL TARTRATE SCH MG: 25 TABLET, FILM COATED ORAL at 21:55

## 2020-05-03 RX ADMIN — INSULIN LISPRO SCH: 100 INJECTION, SOLUTION INTRAVENOUS; SUBCUTANEOUS at 08:29

## 2020-05-03 RX ADMIN — IBUPROFEN SCH MG: 800 TABLET, FILM COATED ORAL at 18:22

## 2020-05-03 RX ADMIN — OXYCODONE AND ACETAMINOPHEN PRN TAB: 5; 325 TABLET ORAL at 20:26

## 2020-05-03 RX ADMIN — OXYCODONE AND ACETAMINOPHEN PRN TAB: 5; 325 TABLET ORAL at 06:57

## 2020-05-03 RX ADMIN — DIPHENHYDRAMINE HYDROCHLORIDE PRN MG: 25 CAPSULE ORAL at 14:14

## 2020-05-03 RX ADMIN — MORPHINE SULFATE PRN MG: 10 INJECTION INTRAMUSCULAR; INTRAVENOUS; SUBCUTANEOUS at 23:16

## 2020-05-03 RX ADMIN — DOCUSATE SODIUM SCH: 100 CAPSULE, LIQUID FILLED ORAL at 18:51

## 2020-05-03 RX ADMIN — IBUPROFEN SCH MG: 800 TABLET, FILM COATED ORAL at 14:11

## 2020-05-03 RX ADMIN — MORPHINE SULFATE PRN MG: 10 INJECTION INTRAMUSCULAR; INTRAVENOUS; SUBCUTANEOUS at 10:18

## 2020-05-03 RX ADMIN — ESCITALOPRAM OXALATE SCH MG: 10 TABLET, FILM COATED ORAL at 10:18

## 2020-05-03 RX ADMIN — FAMOTIDINE SCH MG: 20 TABLET, FILM COATED ORAL at 10:18

## 2020-05-03 RX ADMIN — IBUPROFEN SCH: 800 TABLET, FILM COATED ORAL at 09:51

## 2020-05-03 RX ADMIN — CHLORTHALIDONE SCH MG: 25 TABLET ORAL at 11:14

## 2020-05-03 RX ADMIN — HEPARIN SODIUM SCH: 5000 INJECTION, SOLUTION INTRAVENOUS; SUBCUTANEOUS at 21:51

## 2020-05-03 RX ADMIN — FAMOTIDINE SCH MG: 20 TABLET, FILM COATED ORAL at 21:55

## 2020-05-03 RX ADMIN — DOCUSATE SODIUM SCH: 100 CAPSULE, LIQUID FILLED ORAL at 10:16

## 2020-05-03 RX ADMIN — HEPARIN SODIUM SCH: 5000 INJECTION, SOLUTION INTRAVENOUS; SUBCUTANEOUS at 13:58

## 2020-05-03 RX ADMIN — LISINOPRIL SCH MG: 10 TABLET ORAL at 10:18

## 2020-05-03 RX ADMIN — HEPARIN SODIUM SCH: 5000 INJECTION, SOLUTION INTRAVENOUS; SUBCUTANEOUS at 05:17

## 2020-05-03 RX ADMIN — INSULIN LISPRO SCH: 100 INJECTION, SOLUTION INTRAVENOUS; SUBCUTANEOUS at 13:58

## 2020-05-03 RX ADMIN — INSULIN LISPRO SCH: 100 INJECTION, SOLUTION INTRAVENOUS; SUBCUTANEOUS at 22:23

## 2020-05-03 RX ADMIN — METOPROLOL TARTRATE SCH MG: 25 TABLET, FILM COATED ORAL at 10:18

## 2020-05-03 NOTE — PDOC PROGRESS REPORT
Subjective


Progress Note for:: 05/03/20


Subjective:: 





5/2/2020


Patient overall doing quite well today.  She is having a great deal of pain 

during her dressing changes but otherwise her pain is well controlled.  We will 

start transitioning her to oral pain medications rather than IV and I 

recommended that these be given her approximately 30 minutes prior to her 

dressing change.  She is interested in starting a vegan diet and we spent a 

great deal of time talking about this yesterday.  She has no new complaints.  

Surgery has signed off.  We are waiting for her to be accepted at a nursing 

facility for wound care.





5/3/2020


Patient overall doing quite well other than having some new itching in her 

wounds which she associates with the healing process.  PRN Benadryl has been 

ordered for this.  Lotions have thus far been unsuccessful per nursing.  Pain is

well controlled for the most part when she is not having dressing changes.  

Blood pressure elevated and her medications have been increased today.  We await

placement for her to SNF.  We will need to check her coronavirus test which is 

required for admission to any of the local nursing facilities.


Reason For Visit: 


SEPSIS,BUTTOCKS ABSCESS








Physical Exam


Vital Signs: 


                                        











Temp Pulse Resp BP Pulse Ox


 


 98.1 F   91   18   151/111 H  98 


 


 05/03/20 11:18  05/03/20 11:18  05/03/20 11:18  05/03/20 11:18  05/03/20 11:18








                                 Intake & Output











 05/02/20 05/03/20 05/04/20





 06:59 06:59 06:59


 


Intake Total 1840 2130 840


 


Balance 1840 2130 840


 


Weight 208.5 kg 206.6 kg 











General appearance: PRESENT: no acute distress, well-developed, well-nourished


Head exam: PRESENT: atraumatic, normocephalic


Eye exam: PRESENT: conjunctiva pink


Mouth exam: PRESENT: moist


Respiratory exam: PRESENT: clear to auscultation viridiana.  ABSENT: rales, rhonchi, w

heezes


Cardiovascular exam: PRESENT: RRR.  ABSENT: diastolic murmur, rubs, systolic 

murmur


GI/Abdominal exam: PRESENT: normal bowel sounds, soft.  ABSENT: distended, 

guarding, mass, organolmegaly, rebound, tenderness


Neurological exam: PRESENT: alert, awake, oriented to person, oriented to place,

oriented to time, oriented to situation


Psychiatric exam: PRESENT: appropriate affect, normal mood


Skin exam: PRESENT: dry, warm, other - Extensive lower extremity wounds healing 

well and clean





Results


Laboratory Results: 


                                        





                                 05/01/20 05:49 





                                 05/01/20 05:49 








Impressions: 


                                        





Extremity Ultrasound  04/24/20 05:34


IMPRESSION:


 


Moderate soft tissue edema/cellulitis of the right gluteal


region. Limitation.


 














Assessment and Plan





- Diagnosis


(1) Perirectal abscess


Is this a current diagnosis for this admission?: Yes   


Plan: 


Now status post surgical I&D by Dr. Smith.


Wound culture shows gram-negative rods gram-positive cocci, and gram-positive 

rods


Blood culture (1 of 4 bottles) shows gram-positive cocci in clusters. 


WBCs trending down





Surgery is consulted; wound care per their expertise.


Patient is admitted to the medical floor.


She has been empirically placed on IV vancomycin and Zosyn.


Antiemetics and analgesics as needed.


Discharge planning is consulted





4/27 Will change abx to Ceftriaxone





4/29 awaiting evaluation by surgery for possible intervention again today 

patient remains n.p.o.





5/1/2020


Her extensive wounds will require a great deal of care after discharge.  She 

will be unable to care for her own wounds to her severe obesity and the 

extensive nature of her wounds.  She will need to go to nursing facility but we 

have yet to find one that accepts her.


Continued on ceftriaxone





5/2/2020


Pain seems to be only uncontrolled during dressing changes.  Transition oral 

narcotics and off of IV options.


Surgery has signed off


Needs to go to SNF for wound care, not accepted yet





5/3/2020


Patient having a great deal of itching at her wounds which she associates with 

healing otherwise pain is very well controlled she is comfortable


As needed Benadryl added








(2) Hypertension


Is this a current diagnosis for this admission?: Yes   





(3) Morbid obesity with BMI of 70 and over, adult


Is this a current diagnosis for this admission?: Yes   





(4) Necrotizing soft tissue infection


Is this a current diagnosis for this admission?: Yes   





(5) Prediabetes


Is this a current diagnosis for this admission?: Yes   





(6) Sepsis


Qualifiers: 


   Sepsis type: sepsis due to unspecified organism   Sepsis acute organ 

dysfunction status: unspecified   Qualified Code(s): A41.9 - Sepsis, unspecified

organism   


Is this a current diagnosis for this admission?: Yes   





(7) Depression


Is this a current diagnosis for this admission?: Yes   





- Plan Summary


Summary: 


Wound culture is yielding multiple organisms including gram-positive cocci in 

chains, Clostridium non-perfringens, E. coli and she had a blood culture that 

grew Staphylococcus in clusters.  Blood culture has been negative.  There was 

just 1 blood culture out of 4+ possibly contaminant.  Looking at a profile of 

antibiotic sensitivity including E. coli I think ceftriaxone is probably a good 

choice as this will also cover the multiple gram-positive organisms.  I will 

change antibiotics to ceftriaxone.  Her white count is down to 14,000





4/28 count is slightly elevated however patient remains clinically stable.  I 

did change to ceftriaxone yesterday.  Cultures do seem to be susceptible to 

that.  I will hold off on changing antibiotics and reevaluate tomorrow.  There 

is also the possibility that she may go to the OR as per surgery





4/29 WBC improved, continue same abx





4/30Status post wide debridement of ischial abscess.  Wound apparently doing 

better and no need for wound VAC or for the OR intervention at this time.  

Patient will need to go to rehab as she will need wound care which she cannot 

provide a currently provided by family.  She is still on IV antibiotics although

she has improved with a white count coming down from 29.2-12.6.  Wound culture 

is yielding multiple organisms.  Patient is currently on ceftriaxone but I 

believe this can be changed to oral antibiotics in a few days and probably 

discharge to rehab early next week s











- Time


Time Spent with patient: 15-24 minutes


Medications reviewed and adjusted accordingly: Yes


Anticipated discharge: SNF


Within: within 48 hours





- Inpatient Certification


Based on my medical assessment, after consideration of the patient's 

comorbidities, presenting symptoms, or acuity I expect that the services needed 

warrant INPATIENT care.: Yes


I certify that my determination is in accordance with my understanding of 

Medicare's requirements for reasonable and necessary INPATIENT services [42 CFR 

412.3e].: Yes


Medical Necessity: Significant Comorbidiites Make Outpatient Treatment Too 

Risky, Need Close Monitoring Due to Risk of Patient Decompensation, Need for IV 

Antibiotics, Risk of Complication if Not Cared For in Hospital, Risk of 

Diagnosis Which Will Require Inpatient Eval/Care/Monitoring

## 2020-05-04 RX ADMIN — IBUPROFEN SCH MG: 800 TABLET, FILM COATED ORAL at 11:51

## 2020-05-04 RX ADMIN — METOPROLOL TARTRATE SCH MG: 25 TABLET, FILM COATED ORAL at 21:21

## 2020-05-04 RX ADMIN — METOPROLOL TARTRATE SCH MG: 25 TABLET, FILM COATED ORAL at 10:00

## 2020-05-04 RX ADMIN — CHLORTHALIDONE SCH MG: 25 TABLET ORAL at 10:00

## 2020-05-04 RX ADMIN — INSULIN LISPRO SCH UNIT: 100 INJECTION, SOLUTION INTRAVENOUS; SUBCUTANEOUS at 11:45

## 2020-05-04 RX ADMIN — DOCUSATE SODIUM SCH: 100 CAPSULE, LIQUID FILLED ORAL at 17:51

## 2020-05-04 RX ADMIN — DOCUSATE SODIUM SCH: 100 CAPSULE, LIQUID FILLED ORAL at 10:01

## 2020-05-04 RX ADMIN — OXYCODONE AND ACETAMINOPHEN PRN TAB: 5; 325 TABLET ORAL at 03:38

## 2020-05-04 RX ADMIN — IBUPROFEN SCH MG: 800 TABLET, FILM COATED ORAL at 16:38

## 2020-05-04 RX ADMIN — OXYCODONE AND ACETAMINOPHEN PRN TAB: 5; 325 TABLET ORAL at 22:38

## 2020-05-04 RX ADMIN — HEPARIN SODIUM SCH: 5000 INJECTION, SOLUTION INTRAVENOUS; SUBCUTANEOUS at 05:06

## 2020-05-04 RX ADMIN — LISINOPRIL SCH MG: 10 TABLET ORAL at 10:00

## 2020-05-04 RX ADMIN — DIPHENHYDRAMINE HYDROCHLORIDE PRN MG: 25 CAPSULE ORAL at 10:28

## 2020-05-04 RX ADMIN — CEFTRIAXONE SCH MLS/HR: 2 INJECTION, SOLUTION INTRAVENOUS at 09:58

## 2020-05-04 RX ADMIN — HEPARIN SODIUM SCH UNIT: 5000 INJECTION, SOLUTION INTRAVENOUS; SUBCUTANEOUS at 14:56

## 2020-05-04 RX ADMIN — INSULIN LISPRO SCH: 100 INJECTION, SOLUTION INTRAVENOUS; SUBCUTANEOUS at 21:19

## 2020-05-04 RX ADMIN — HEPARIN SODIUM SCH UNIT: 5000 INJECTION, SOLUTION INTRAVENOUS; SUBCUTANEOUS at 21:21

## 2020-05-04 RX ADMIN — INSULIN LISPRO SCH: 100 INJECTION, SOLUTION INTRAVENOUS; SUBCUTANEOUS at 15:52

## 2020-05-04 RX ADMIN — FAMOTIDINE SCH MG: 20 TABLET, FILM COATED ORAL at 10:00

## 2020-05-04 RX ADMIN — IBUPROFEN SCH MG: 800 TABLET, FILM COATED ORAL at 08:17

## 2020-05-04 RX ADMIN — MORPHINE SULFATE PRN MG: 10 INJECTION INTRAMUSCULAR; INTRAVENOUS; SUBCUTANEOUS at 09:59

## 2020-05-04 RX ADMIN — INSULIN LISPRO SCH: 100 INJECTION, SOLUTION INTRAVENOUS; SUBCUTANEOUS at 07:54

## 2020-05-04 RX ADMIN — ESCITALOPRAM OXALATE SCH MG: 10 TABLET, FILM COATED ORAL at 10:01

## 2020-05-04 RX ADMIN — MORPHINE SULFATE PRN MG: 10 INJECTION INTRAMUSCULAR; INTRAVENOUS; SUBCUTANEOUS at 23:42

## 2020-05-04 RX ADMIN — FAMOTIDINE SCH MG: 20 TABLET, FILM COATED ORAL at 21:21

## 2020-05-04 RX ADMIN — DIPHENHYDRAMINE HYDROCHLORIDE PRN MG: 25 CAPSULE ORAL at 21:31

## 2020-05-04 NOTE — PDOC TRANSFER SUMMARY
Impression





- Admit/DC Date/PCP


Admission Date/Primary Care Provider: 


  04/24/20 09:41





  MARYCRUZ OSEGUERA PA-C





Discharge Date: 05/04/20





- Discharge Diagnosis


(1) Perirectal abscess


Is this a current diagnosis for this admission?: Yes   





(2) Hypertension


Is this a current diagnosis for this admission?: Yes   





(3) Morbid obesity with BMI of 70 and over, adult


Is this a current diagnosis for this admission?: Yes   





(4) Necrotizing soft tissue infection


Is this a current diagnosis for this admission?: Yes   





(5) Prediabetes


Is this a current diagnosis for this admission?: Yes   





(6) Sepsis


Is this a current diagnosis for this admission?: Yes   





(7) Depression


Is this a current diagnosis for this admission?: Yes   





- Assessment


Summary: 


Wound culture is yielding multiple organisms including gram-positive cocci in 

chains, Clostridium non-perfringens, E. coli and she had a blood culture that 

grew Staphylococcus in clusters.  Blood culture has been negative.  There was 

just 1 blood culture out of 4+ possibly contaminant.  Looking at a profile of 

antibiotic sensitivity including E. coli I think ceftriaxone is probably a good 

choice as this will also cover the multiple gram-positive organisms.  I will 

change antibiotics to ceftriaxone.  Her white count is down to 14,000





4/28 count is slightly elevated however patient remains clinically stable.  I 

did change to ceftriaxone yesterday.  Cultures do seem to be susceptible to 

that.  I will hold off on changing antibiotics and reevaluate tomorrow.  There 

is also the possibility that she may go to the OR as per surgery





4/29 WBC improved, continue same abx





4/30Status post wide debridement of ischial abscess.  Wound apparently doing 

better and no need for wound VAC or for the OR intervention at this time.  

Patient will need to go to rehab as she will need wound care which she cannot 

provide a currently provided by family.  She is still on IV antibiotics although

she has improved with a white count coming down from 29.2-12.6.  Wound culture 

is yielding multiple organisms.  Patient is currently on ceftriaxone but I 

believe this can be changed to oral antibiotics in a few days and probably 

discharge to rehab early next week s











- Additional Information


Resuscitation Status: Full Code


Discharge Diet: As Tolerated, Diabetic


Discharge Activity: Activity As Tolerated


Referrals: 


MARYCRUZ OSEGUERA PA-C [Primary Care Provider] - Follow up as needed


Prescriptions: 


Cephalexin Monohydrate [Keflex 500 mg Capsule] 500 mg PO QID 3 Days #12 capsule


Metformin HCl [Metformin ER Osmotic] 500 mg PO DAILY #30 tab.er.24


Oxycodone HCl/Acetaminophen [Percocet 5-325 mg Tablet] 1 tab PO Q4HP PRN #16 

tablet


 PRN Reason: 


Home Medications: 








Escitalopram Oxalate [Lexapro] 10 mg PO DAILY 12/23/11 


Albuterol Sulfate [Proair HFA Inhalation Aerosol 8.5 gm MDI] 2 puff IH Q4HP PRN 

04/24/20 


Cephalexin Monohydrate [Keflex 500 mg Capsule] 500 mg PO QID 3 Days #12 capsule 

05/04/20 


Chlorthalidone [Hygroton 25 mg Tablet] 25 mg PO DAILY  tablet 05/04/20 


Diphenhydramine HCl [Benadryl 25 mg Capsule] 25 mg PO Q6HP PRN  capsule 05/04/20




Docusate Sodium [Colace 100 mg Capsule] 100 mg PO BID  capsule 05/04/20 


Famotidine [Pepcid 20 mg Tablet] 20 mg PO Q12  tablet 05/04/20 


Ibuprofen [Motrin 800 mg Tablet] 800 mg PO Q8HP PRN  tablet 05/04/20 


Lisinopril [Prinivil 10 mg Tablet] 40 mg PO DAILY  tablet 05/04/20 


Mag Hydrox/Al Hydrox/Simeth [Maalox Plus Susp 30 Udcup] 30 ml PO Q6HP PRN  udc 

05/04/20 


Metformin HCl [Metformin ER Osmotic] 500 mg PO DAILY #30 tab.er.24 05/04/20 


Metoprolol Tartrate [Lopressor 25 mg Tablet] 25 mg PO Q12  tablet 05/04/20 


Ondansetron HCl/Pf [Zofran Inj/Pf 4 mg/2 ml Sdv] 4 mg IV Q6HP PRN  vial 05/04/20




Oxycodone HCl/Acetaminophen [Percocet 5-325 mg Tablet] 1 tab PO Q4HP PRN #16 

tablet 05/04/20 











History of Present Illiness


History of Present Illness: 





Per admitting physician:


"ELVIRA BARKER is a 34 year old female with a past medical history of 

hypertension, depression, and morbid obesity who presented to the emergency 

department today with complaints of right buttocks pain.


Evaluation emergency department revealed normal temperature, tachycardia 133, 

acceptable blood pressure, tachypnea, 100% on room air.  Leukocytosis (WBCs 29.2

with a left-sided shift), mild anemia, Chemistry revealing elevated glucose 156 

mildly elevated LFTs, and a negative urinalysis.  Ultrasound revealed soft 

tissue erythema/cellulitis to the right gluteal region.  Patient body habitus 

prevents CT scanning.


She is referred to the hospital service for admission and management of the 

above-stated complaints and findings with surgery consulted."








Hospital Course


Hospital Course: 


Patient admitted for severe right buttock abscess and cellulitis, underwent 

extensive I&D of this area, culture sent to microbiology grew E. coli, 

Prevotella, E faecalis, Peptostreptococcus, Clostridium (non-perfringens).  A 

single blood culture grew coag negative staph and a repeat blood culture grew 

nothing.  Patient was initially started on vancomycin/Zosyn IV for several days 

and transition to IV ceftriaxone.  On day 11 of IV antibiotics patient was 

discharged and transition to Keflex to complete a 14-day course.  She will have 

close follow-up with general surgery and should also follow-up with her PCP.  He

has been recommended that she lose a considerable amount of weight safely and 

gradually by way of changing her diet to a vegan diet.  She was counseled 

extensively on utilizing B vitamin, protein, and iron supplements to complement 

this diet.  She will be sent to a nursing facility for continuing wound care 

when she could not receive at home with simple home health.



































Previously in hospital course:





5/2/2020


Patient overall doing quite well today.  She is having a great deal of pain 

during her dressing changes but otherwise her pain is well controlled.  We will 

start transitioning her to oral pain medications rather than IV and I 

recommended that these be given her approximately 30 minutes prior to her 

dressing change.  She is interested in starting a vegan diet and we spent a 

great deal of time talking about this yesterday.  She has no new complaints.  

Surgery has signed off.  We are waiting for her to be accepted at a nursing 

facility for wound care.





5/3/2020


Patient overall doing quite well other than having some new itching in her 

wounds which she associates with the healing process.  PRN Benadryl has been 

ordered for this.  Lotions have thus far been unsuccessful per nursing.  Pain is

well controlled for the most part when she is not having dressing changes.  

Blood pressure elevated and her medications have been increased today.  We await

placement for her to SNF.  We will need to check her coronavirus test which is 

required for admission to any of the local nursing facilities.





























(1) Perirectal abscess


Is this a current diagnosis for this admission?: Yes   


Plan: 





4/26


Now status post surgical I&D by Dr. Smith.


Wound culture shows gram-negative rods gram-positive cocci, and gram-positive 

rods


Blood culture (1 of 4 bottles) shows gram-positive cocci in clusters. 


WBCs trending down


Surgery is consulted; wound care per their expertise.


Patient is admitted to the medical floor.


She has been empirically placed on IV vancomycin and Zosyn.


Antiemetics and analgesics as needed.


Discharge planning is consulted





4/27 Will change abx to Ceftriaxone





4/29 awaiting evaluation by surgery for possible intervention again today 

patient remains n.p.o.





5/1/2020


Her extensive wounds will require a great deal of care after discharge.  She 

will be unable to care for her own wounds to her severe obesity and the extensi

ve nature of her wounds.  She will need to go to nursing facility but we have 

yet to find one that accepts her.


Continued on ceftriaxone





5/2/2020


Pain seems to be only uncontrolled during dressing changes.  Transition oral 

narcotics and off of IV options.


Surgery has signed off


Needs to go to SNF for wound care, not accepted yet





5/3/2020


Patient having a great deal of itching at her wounds which she associates with 

healing otherwise pain is very well controlled she is comfortable


As needed Benadryl added





-Keflex at DC to complete 14d course








(2) Hypertension


Is this a current diagnosis for this admission?: Yes   





(3) Morbid obesity with BMI of 70 and over, adult


Is this a current diagnosis for this admission?: Yes   





(4) Necrotizing soft tissue infection


Is this a current diagnosis for this admission?: Yes   





(5) T2DM


Is this a current diagnosis for this admission?: Yes   


-metformin at DC





(6) Sepsis


Qualifiers: 


   Sepsis type: sepsis due to unspecified organism   Sepsis acute organ 

dysfunction status: unspecified   Qualified Code(s): A41.9 - Sepsis, unspecified

organism   


Is this a current diagnosis for this admission?: Yes   





(7) Depression


Is this a current diagnosis for this admission?: Yes   





Physical Exam


Vital Signs: 


                                        











Temp Pulse Resp BP Pulse Ox


 


 97.9 F   70   18   125/55 L  97 


 


 05/04/20 07:33  05/04/20 07:33  05/04/20 07:33  05/04/20 07:33  05/04/20 07:33








                                 Intake & Output











 05/03/20 05/04/20 05/05/20





 06:59 06:59 06:59


 


Intake Total 2130 2320 650


 


Balance 2130 2320 650


 


Weight 206.6 kg 202.6 kg 











General appearance: PRESENT: no acute distress, well-developed, well-nourished


Head exam: PRESENT: atraumatic, normocephalic


Eye exam: PRESENT: conjunctiva pink


Mouth exam: PRESENT: moist


Respiratory exam: PRESENT: clear to auscultation viridiana.  ABSENT: rales, rhonchi, 

wheezes


Cardiovascular exam: PRESENT: RRR.  ABSENT: diastolic murmur, rubs, systolic 

murmur


GI/Abdominal exam: PRESENT: normal bowel sounds, soft.  ABSENT: distended, 

guarding, mass, organolmegaly, rebound, tenderness


Neurological exam: PRESENT: alert, awake, oriented to person, oriented to place,

oriented to time, oriented to situation


Psychiatric exam: PRESENT: appropriate affect, normal mood


Skin exam: PRESENT: dry, warm, other - Large right buttock wound healing, pink 

and well appearing





Results


Laboratory Results: 


                                        











WBC  9.1 10^3/uL (4.0-10.5)   05/01/20  05:49    


 


RBC  3.80 10^6/uL (3.72-5.28)   05/01/20  05:49    


 


Hgb  9.2 g/dL (12.0-15.5)  L  05/01/20  05:49    


 


Hct  27.6 % (36.0-47.0)  L  05/01/20  05:49    


 


MCV  73 fl (80-97)  L  05/01/20  05:49    


 


MCH  24.3 pg (27.0-33.4)  L  05/01/20  05:49    


 


MCHC  33.4 g/dL (32.0-36.0)   05/01/20  05:49    


 


RDW  17.0 % (11.5-14.0)  H  05/01/20  05:49    


 


Plt Count  485 10^3/uL (150-450)  H  05/01/20  05:49    


 


Lymph % (Auto)  23.2 % (13-45)   05/01/20  05:49    


 


Mono % (Auto)  5.0 % (3-13)   05/01/20  05:49    


 


Eos % (Auto)  7.2 % (0-6)  H  05/01/20  05:49    


 


Baso % (Auto)  0.8 % (0-2)   05/01/20  05:49    


 


Absolute Neuts (auto)  5.8 10^3/uL (1.7-8.2)   05/01/20  05:49    


 


Absolute Lymphs (auto)  2.1 10^3/uL (0.5-4.7)   05/01/20  05:49    


 


Absolute Monos (auto)  0.5 10^3/uL (0.1-1.4)   05/01/20  05:49    


 


Absolute Eos (auto)  0.7 10^3/uL (0.0-0.6)  H  05/01/20  05:49    


 


Absolute Basos (auto)  0.1 10^3/uL (0.0-0.2)   05/01/20  05:49    


 


Total Counted  100   04/29/20  05:45    


 


Seg Neutrophils %  63.8 % (42-78)   05/01/20  05:49    


 


Seg Neuts % (Manual)  73 % (42-78)   04/29/20  05:45    


 


Band Neutrophils %  6 % (3-5)  H  04/24/20  02:06    


 


Lymphocytes % (Manual)  18 % (13-45)   04/29/20  05:45    


 


Atypical Lymphs %  1 % (0)   04/24/20  02:06    


 


Monocytes % (Manual)  3 % (3-13)   04/29/20  05:45    


 


Eosinophils % (Manual)  5 % (0-6)   04/29/20  05:45    


 


Basophils % (Manual)  1 % (0-2)   04/29/20  05:45    


 


Abs Neuts (Manual)  9.2 10^3/uL (1.7-8.2)  H  04/29/20  05:45    


 


Abs Lymphs (Manual)  2.3 10^3/uL (0.5-4.7)   04/29/20  05:45    


 


Abs Monocytes (Manual)  0.4 10^3/uL (0.1-1.4)   04/29/20  05:45    


 


Absolute Eos (Manual)  0.6 10^3/uL (0.0-0.6)   04/29/20  05:45    


 


Abs Basophils (Manual)  0.1 10^3/uL (0.0-0.2)   04/29/20  05:45    


 


Nucleated RBCs  1 /100 WBC (0)   04/29/20  05:45    


 


Toxic Granulation  1+   04/25/20  05:20    


 


Platelet Comment  ADEQUATE   04/29/20  05:45    


 


Hypochromasia  SLIGHT   04/29/20  05:45    


 


Poikilocytosis  1+   04/25/20  05:20    


 


Anisocytosis  1+   04/29/20  05:45    


 


Microcytosis  1+   04/29/20  05:45    


 


Target Cells  SLIGHT   04/25/20  05:20    


 


Tear Drop Cells  SLIGHT   04/24/20  02:06    


 


Ovalocytes  1+   04/25/20  05:20    


 


PT  15.0 SEC (11.4-15.4)   04/24/20  02:06    


 


INR  1.18   04/24/20  02:06    


 


VBG pH  7.44  (7.30-7.42)  H  04/24/20  02:43    


 


VBG pCO2  38.0 mmHg (35-63)   04/24/20  02:43    


 


VBG HCO3  25.5 mmol/L (20-32)   04/24/20  02:43    


 


VBG Base Excess  1.5 mmol/L  04/24/20  02:43    


 


Sodium  137.0 mmol/L (137-145)   05/01/20  05:49    


 


Potassium  4.4 mmol/L (3.6-5.0)   05/01/20  05:49    


 


Chloride  105 mmol/L ()   05/01/20  05:49    


 


Carbon Dioxide  27 mmol/L (22-30)   05/01/20  05:49    


 


Anion Gap  5  (5-19)   05/01/20  05:49    


 


BUN  11 mg/dL (7-20)   05/01/20  05:49    


 


Creatinine  0.89 mg/dL (0.52-1.25)   05/01/20  05:49    


 


Est GFR ( Amer)  > 60  (>60)   05/01/20  05:49    


 


Est GFR (MDRD) Non-Af  > 60  (>60)   05/01/20  05:49    


 


Glucose  104 mg/dL ()   05/01/20  05:49    


 


POC Glucose  106 mg/dL ()   05/04/20  14:54    


 


Hemoglobin A1c %  6.6 % (4.7-6.0)  H  04/25/20  05:20    


 


Lactic Acid  1.2 mmol/L (0.7-2.1)   04/24/20  09:09    


 


Calcium  8.6 mg/dL (8.4-10.2)   05/01/20  05:49    


 


Total Bilirubin  0.6 mg/dL (0.2-1.3)   04/24/20  02:06    


 


Direct Bilirubin  0.2 mg/dL (0.0-0.4)   04/24/20  02:06    


 


Neonat Total Bilirubin  0.4 mg/dL (0.1-1.1)   04/24/20  02:06    


 


Neonat Direct Bilirubin  0.0 mg/dL (0.0-0.3)   04/24/20  02:06    


 


Neonat Indirect Bili  Not Reportable   04/24/20  02:06    


 


AST  58 U/L (14-36)  H  04/24/20  02:06    


 


ALT  38 U/L (<35)  H  04/24/20  02:06    


 


Alkaline Phosphatase  156 U/L ()  H  04/24/20  02:06    


 


Total Protein  7.1 g/dL (6.3-8.2)   04/24/20  02:06    


 


Albumin  3.1 g/dL (3.5-5.0)  L  04/24/20  02:06    


 


Triglycerides  144 mg/dL (<150)   04/25/20  05:20    


 


Cholesterol  86.40 mg/dL (0-200)   04/25/20  05:20    


 


LDL Cholesterol Direct  52 mg/dL (<100)   04/25/20  05:20    


 


VLDL Cholesterol  29.0 mg/dL (10-31)   04/25/20  05:20    


 


HDL Cholesterol  12 mg/dL (>40)  L  04/25/20  05:20    


 


TSH  3.57 uIU/mL (0.47-4.68)   04/25/20  05:20    


 


Serum HCG, Qual  NEGATIVE  (NEGATIVE)   04/24/20  02:06    


 


Urine Color  RAJINDER   04/24/20  03:12    


 


Urine Appearance  TURBID   04/24/20  03:12    


 


Urine pH  5.0  (5.0-9.0)   04/24/20  03:12    


 


Ur Specific Gravity  1.021   04/24/20  03:12    


 


Urine Protein  30 mg/dL (NEGATIVE)  H  04/24/20  03:12    


 


Urine Glucose (UA)  NEGATIVE mg/dL (NEGATIVE)   04/24/20  03:12    


 


Urine Ketones  NEGATIVE mg/dL (NEGATIVE)   04/24/20  03:12    


 


Urine Blood  SMALL  (NEGATIVE)  H  04/24/20  03:12    


 


Urine Nitrite (Reflex)  NEGATIVE  (NEGATIVE)   04/24/20  03:12    


 


Urine Bilirubin  NEGATIVE  (NEGATIVE)   04/24/20  03:12    


 


Urine Urobilinogen  2.0 mg/dL (<2.0)  H  04/24/20  03:12    


 


Leukocyte Esterase Rfl  NEGATIVE  (NEGATIVE)   04/24/20  03:12    


 


Urine WBC (Reflex)  2 /HPF  04/24/20  03:12    


 


Squamous Epi Cells Auto  2 /HPF  04/24/20  03:12    


 


Urine Mucus (Auto)  MOD /LPF  04/24/20  03:12    


 


Urine Ascorbic Acid  NEGATIVE  (NEGATIVE)   04/24/20  03:12    


 


Time Trough Drawn  1341   04/27/20  13:41    


 


Vancomycin Trough  16.5 ug/mL (5.0-20.0)   04/27/20  13:41    


 


COVID-19 Source  NASOPHARYNGEAL   04/28/20  14:30    


 


COVID-19 (GAMALIEL)  NOT DETECTED   04/28/20  14:30    











Impressions: 


                                        





Extremity Ultrasound  04/24/20 05:34


IMPRESSION:


 


Moderate soft tissue edema/cellulitis of the right gluteal


region. Limitation.


 














Plan


Time Spent: Greater than 30 Minutes





Stroke


Is this a Stroke Patient?: No





Acute Heart Failure





- **


Is this a Heart Failure Patient?: No

## 2020-05-05 RX ADMIN — INSULIN LISPRO SCH: 100 INJECTION, SOLUTION INTRAVENOUS; SUBCUTANEOUS at 16:11

## 2020-05-05 RX ADMIN — DIPHENHYDRAMINE HYDROCHLORIDE PRN MG: 25 CAPSULE ORAL at 08:41

## 2020-05-05 RX ADMIN — FAMOTIDINE SCH MG: 20 TABLET, FILM COATED ORAL at 21:09

## 2020-05-05 RX ADMIN — OXYCODONE AND ACETAMINOPHEN PRN TAB: 5; 325 TABLET ORAL at 06:51

## 2020-05-05 RX ADMIN — METOPROLOL TARTRATE SCH MG: 25 TABLET, FILM COATED ORAL at 21:09

## 2020-05-05 RX ADMIN — HEPARIN SODIUM SCH UNIT: 5000 INJECTION, SOLUTION INTRAVENOUS; SUBCUTANEOUS at 13:05

## 2020-05-05 RX ADMIN — DIPHENHYDRAMINE HYDROCHLORIDE PRN MG: 25 CAPSULE ORAL at 16:43

## 2020-05-05 RX ADMIN — HEPARIN SODIUM SCH UNIT: 5000 INJECTION, SOLUTION INTRAVENOUS; SUBCUTANEOUS at 05:38

## 2020-05-05 RX ADMIN — MORPHINE SULFATE PRN MG: 10 INJECTION INTRAMUSCULAR; INTRAVENOUS; SUBCUTANEOUS at 08:55

## 2020-05-05 RX ADMIN — DOCUSATE SODIUM SCH: 100 CAPSULE, LIQUID FILLED ORAL at 09:30

## 2020-05-05 RX ADMIN — MORPHINE SULFATE PRN MG: 10 INJECTION INTRAMUSCULAR; INTRAVENOUS; SUBCUTANEOUS at 14:16

## 2020-05-05 RX ADMIN — IBUPROFEN SCH MG: 800 TABLET, FILM COATED ORAL at 13:04

## 2020-05-05 RX ADMIN — HEPARIN SODIUM SCH UNIT: 5000 INJECTION, SOLUTION INTRAVENOUS; SUBCUTANEOUS at 21:09

## 2020-05-05 RX ADMIN — INSULIN LISPRO SCH: 100 INJECTION, SOLUTION INTRAVENOUS; SUBCUTANEOUS at 21:10

## 2020-05-05 RX ADMIN — INSULIN LISPRO SCH: 100 INJECTION, SOLUTION INTRAVENOUS; SUBCUTANEOUS at 07:39

## 2020-05-05 RX ADMIN — IBUPROFEN SCH MG: 800 TABLET, FILM COATED ORAL at 16:43

## 2020-05-05 RX ADMIN — LISINOPRIL SCH MG: 10 TABLET ORAL at 09:29

## 2020-05-05 RX ADMIN — METOPROLOL TARTRATE SCH MG: 25 TABLET, FILM COATED ORAL at 09:30

## 2020-05-05 RX ADMIN — IBUPROFEN SCH MG: 800 TABLET, FILM COATED ORAL at 08:39

## 2020-05-05 RX ADMIN — INSULIN LISPRO SCH: 100 INJECTION, SOLUTION INTRAVENOUS; SUBCUTANEOUS at 11:38

## 2020-05-05 RX ADMIN — DOCUSATE SODIUM SCH: 100 CAPSULE, LIQUID FILLED ORAL at 17:11

## 2020-05-05 RX ADMIN — FAMOTIDINE SCH MG: 20 TABLET, FILM COATED ORAL at 09:30

## 2020-05-05 RX ADMIN — MORPHINE SULFATE PRN MG: 10 INJECTION INTRAMUSCULAR; INTRAVENOUS; SUBCUTANEOUS at 23:06

## 2020-05-05 RX ADMIN — DIPHENHYDRAMINE HYDROCHLORIDE PRN MG: 25 CAPSULE ORAL at 23:06

## 2020-05-05 RX ADMIN — CHLORTHALIDONE SCH MG: 25 TABLET ORAL at 09:29

## 2020-05-05 RX ADMIN — ESCITALOPRAM OXALATE SCH MG: 10 TABLET, FILM COATED ORAL at 09:30

## 2020-05-05 NOTE — PDOC PROGRESS REPORT
Subjective


Progress Note for:: 05/05/20


Subjective:: 





5/2/2020


Patient overall doing quite well today.  She is having a great deal of pain 

during her dressing changes but otherwise her pain is well controlled.  We will 

start transitioning her to oral pain medications rather than IV and I 

recommended that these be given her approximately 30 minutes prior to her 

dressing change.  She is interested in starting a vegan diet and we spent a 

great deal of time talking about this yesterday.  She has no new complaints.  

Surgery has signed off.  We are waiting for her to be accepted at a nursing 

facility for wound care.





5/3/2020


Patient overall doing quite well other than having some new itching in her 

wounds which she associates with the healing process.  PRN Benadryl has been 

ordered for this.  Lotions have thus far been unsuccessful per nursing.  Pain is

well controlled for the most part when she is not having dressing changes.  

Blood pressure elevated and her medications have been increased today.  We await

placement for her to SNF.  We will need to check her coronavirus test which is 

required for admission to any of the local nursing facilities.





5/5/2020


We are still waiting on SNF placement for patient.  She was going to go to 

facility yesterday but her bed was given back to patient that was already st

aying there.  I have prescribed her metformin that she can start taking after 

discharge.  Her wound is healing gradually and appears to have clean pink 

healthy tissue.  Greatly concerned that the patient will pass stool onto her 

wound and have a recurrence of her abscess/sepsis/infection.  She needs to be 

sent to a SNF rather than home with home health as she requires an enormous 

amount of assistance due to the size and location of her wound in relation to 

her massive body size that limits her ability to clean and care for herself.  

She is not  and only has a 13-year-old daughter at home for the patient 

states is unable to care for her wounds.  Patient has no specific complaints 

today other than some mild itching at the wound intermittently which is relieved

by Benadryl.


Reason For Visit: 


SEPSIS,BUTTOCKS ABSCESS








Physical Exam


Vital Signs: 


                                        











Temp Pulse Resp BP Pulse Ox


 


 97.9 F   72   17   149/65 H  99 


 


 05/05/20 10:52  05/05/20 10:52  05/05/20 10:52  05/05/20 10:52  05/05/20 10:52








                                 Intake & Output











 05/04/20 05/05/20 05/06/20





 06:59 06:59 06:59


 


Intake Total 2320 1570 


 


Balance 2320 1570 


 


Weight 202.6 kg 201.6 kg 











General appearance: PRESENT: no acute distress, well-developed, well-nourished


Head exam: PRESENT: atraumatic, normocephalic


Eye exam: PRESENT: conjunctiva pink


Mouth exam: PRESENT: moist


Respiratory exam: PRESENT: clear to auscultation viridiana.  ABSENT: rales, rhonchi, 

wheezes


Cardiovascular exam: PRESENT: RRR.  ABSENT: diastolic murmur, rubs, systolic 

murmur


GI/Abdominal exam: PRESENT: normal bowel sounds, soft.  ABSENT: distended, 

guarding, mass, organolmegaly, rebound, tenderness


Neurological exam: PRESENT: alert, awake, oriented to person, oriented to place,

oriented to time, oriented to situation


Psychiatric exam: PRESENT: appropriate affect, normal mood


Skin exam: PRESENT: dry, warm, other - Pink healthy-appearing healing large 

wound on right buttock





Results


Laboratory Results: 


                                        





                                 05/01/20 05:49 





                                 05/01/20 05:49 








Impressions: 


                                        





Extremity Ultrasound  04/24/20 05:34


IMPRESSION:


 


Moderate soft tissue edema/cellulitis of the right gluteal


region. Limitation.


 














Assessment and Plan





- Diagnosis


(1) Perirectal abscess


Is this a current diagnosis for this admission?: Yes   


Plan: 


Now status post surgical I&D by Dr. Smith.


Wound culture shows gram-negative rods gram-positive cocci, and gram-positive 

rods


Blood culture (1 of 4 bottles) shows gram-positive cocci in clusters. 


WBCs trending down





Surgery is consulted; wound care per their expertise.


Patient is admitted to the medical floor.


She has been empirically placed on IV vancomycin and Zosyn.


Antiemetics and analgesics as needed.


Discharge planning is consulted





4/27 Will change abx to Ceftriaxone





4/29 awaiting evaluation by surgery for possible intervention again today 

patient remains n.p.o.





5/1/2020


Her extensive wounds will require a great deal of care after discharge.  She 

will be unable to care for her own wounds to her severe obesity and the extens

jayce nature of her wounds.  She will need to go to nursing facility but we have 

yet to find one that accepts her.


Continued on ceftriaxone





5/2/2020


Pain seems to be only uncontrolled during dressing changes.  Transition oral 

narcotics and off of IV options.


Surgery has signed off


Needs to go to SNF for wound care, not accepted yet





5/3/2020


Patient having a great deal of itching at her wounds which she associates with 

healing otherwise pain is very well controlled she is comfortable


As needed Benadryl added





5/5/2020


Requires great deal of help caring for this wound and keeping stool out of the 

wound, require SNF placement and there is no bed available


Antibiotics continue








(2) Hypertension


Is this a current diagnosis for this admission?: Yes   





(3) Morbid obesity with BMI of 70 and over, adult


Is this a current diagnosis for this admission?: Yes   





(4) Necrotizing soft tissue infection


Is this a current diagnosis for this admission?: Yes   





(5) Prediabetes


Is this a current diagnosis for this admission?: Yes   





(6) Sepsis


Qualifiers: 


   Sepsis type: sepsis due to unspecified organism   Sepsis acute organ 

dysfunction status: unspecified   Qualified Code(s): A41.9 - Sepsis, unspecified

organism   


Is this a current diagnosis for this admission?: Yes   





(7) Depression


Is this a current diagnosis for this admission?: Yes   





- Plan Summary


Summary: 


Wound culture is yielding multiple organisms including gram-positive cocci in 

chains, Clostridium non-perfringens, E. coli and she had a blood culture that 

grew Staphylococcus in clusters.  Blood culture has been negative.  There was 

just 1 blood culture out of 4+ possibly contaminant.  Looking at a profile of 

antibiotic sensitivity including E. coli I think ceftriaxone is probably a good 

choice as this will also cover the multiple gram-positive organisms.  I will 

change antibiotics to ceftriaxone.  Her white count is down to 14,000





4/28 count is slightly elevated however patient remains clinically stable.  I 

did change to ceftriaxone yesterday.  Cultures do seem to be susceptible to 

that.  I will hold off on changing antibiotics and reevaluate tomorrow.  There 

is also the possibility that she may go to the OR as per surgery





4/29 WBC improved, continue same abx





4/30Status post wide debridement of ischial abscess.  Wound apparently doing 

better and no need for wound VAC or for the OR intervention at this time.  

Patient will need to go to rehab as she will need wound care which she cannot 

provide a currently provided by family.  She is still on IV antibiotics although

she has improved with a white count coming down from 29.2-12.6.  Wound culture 

is yielding multiple organisms.  Patient is currently on ceftriaxone but I 

believe this can be changed to oral antibiotics in a few days and probably 

discharge to rehab early next week s











- Time


Time Spent with patient: 15-24 minutes


Medications reviewed and adjusted accordingly: Yes


Anticipated discharge: SNF


Within: within 48 hours





- Inpatient Certification


Based on my medical assessment, after consideration of the patient's 

comorbidities, presenting symptoms, or acuity I expect that the services needed 

warrant INPATIENT care.: Yes


I certify that my determination is in accordance with my understanding of 

Medicare's requirements for reasonable and necessary INPATIENT services [42 CFR 

412.3e].: Yes


Medical Necessity: Significant Comorbidiites Make Outpatient Treatment Too 

Risky, Need Close Monitoring Due to Risk of Patient Decompensation, Need for IV 

Antibiotics, Risk of Complication if Not Cared For in Hospital, Risk of 

Diagnosis Which Will Require Inpatient Eval/Care/Monitoring

## 2020-05-06 RX ADMIN — FAMOTIDINE SCH MG: 20 TABLET, FILM COATED ORAL at 10:50

## 2020-05-06 RX ADMIN — FAMOTIDINE SCH MG: 20 TABLET, FILM COATED ORAL at 21:43

## 2020-05-06 RX ADMIN — HEPARIN SODIUM SCH UNIT: 5000 INJECTION, SOLUTION INTRAVENOUS; SUBCUTANEOUS at 14:37

## 2020-05-06 RX ADMIN — IBUPROFEN SCH MG: 800 TABLET, FILM COATED ORAL at 07:58

## 2020-05-06 RX ADMIN — OXYCODONE AND ACETAMINOPHEN PRN TAB: 5; 325 TABLET ORAL at 10:59

## 2020-05-06 RX ADMIN — DIPHENHYDRAMINE HYDROCHLORIDE PRN MG: 25 CAPSULE ORAL at 10:54

## 2020-05-06 RX ADMIN — CHLORTHALIDONE SCH MG: 25 TABLET ORAL at 10:49

## 2020-05-06 RX ADMIN — ESCITALOPRAM OXALATE SCH MG: 10 TABLET, FILM COATED ORAL at 10:49

## 2020-05-06 RX ADMIN — NYSTATIN CREAM SCH APPLIC: 100000 CREAM TOPICAL at 21:43

## 2020-05-06 RX ADMIN — HEPARIN SODIUM SCH UNIT: 5000 INJECTION, SOLUTION INTRAVENOUS; SUBCUTANEOUS at 05:34

## 2020-05-06 RX ADMIN — METOPROLOL TARTRATE SCH MG: 25 TABLET, FILM COATED ORAL at 10:49

## 2020-05-06 RX ADMIN — MORPHINE SULFATE PRN MG: 10 INJECTION INTRAMUSCULAR; INTRAVENOUS; SUBCUTANEOUS at 05:35

## 2020-05-06 RX ADMIN — HEPARIN SODIUM SCH UNIT: 5000 INJECTION, SOLUTION INTRAVENOUS; SUBCUTANEOUS at 21:42

## 2020-05-06 RX ADMIN — IBUPROFEN SCH MG: 800 TABLET, FILM COATED ORAL at 11:53

## 2020-05-06 RX ADMIN — MORPHINE SULFATE PRN MG: 10 INJECTION INTRAMUSCULAR; INTRAVENOUS; SUBCUTANEOUS at 23:50

## 2020-05-06 RX ADMIN — MORPHINE SULFATE PRN MG: 10 INJECTION INTRAMUSCULAR; INTRAVENOUS; SUBCUTANEOUS at 13:06

## 2020-05-06 RX ADMIN — INSULIN LISPRO SCH UNIT: 100 INJECTION, SOLUTION INTRAVENOUS; SUBCUTANEOUS at 12:32

## 2020-05-06 RX ADMIN — LISINOPRIL SCH MG: 10 TABLET ORAL at 10:51

## 2020-05-06 RX ADMIN — IBUPROFEN SCH MG: 800 TABLET, FILM COATED ORAL at 17:07

## 2020-05-06 RX ADMIN — METOPROLOL TARTRATE SCH MG: 25 TABLET, FILM COATED ORAL at 21:47

## 2020-05-06 RX ADMIN — INSULIN LISPRO SCH UNIT: 100 INJECTION, SOLUTION INTRAVENOUS; SUBCUTANEOUS at 21:43

## 2020-05-06 RX ADMIN — INSULIN LISPRO SCH: 100 INJECTION, SOLUTION INTRAVENOUS; SUBCUTANEOUS at 15:22

## 2020-05-06 RX ADMIN — INSULIN LISPRO SCH: 100 INJECTION, SOLUTION INTRAVENOUS; SUBCUTANEOUS at 11:40

## 2020-05-06 RX ADMIN — MORPHINE SULFATE PRN MG: 10 INJECTION INTRAMUSCULAR; INTRAVENOUS; SUBCUTANEOUS at 08:39

## 2020-05-06 RX ADMIN — DOCUSATE SODIUM SCH: 100 CAPSULE, LIQUID FILLED ORAL at 11:41

## 2020-05-06 RX ADMIN — DOCUSATE SODIUM SCH: 100 CAPSULE, LIQUID FILLED ORAL at 17:09

## 2020-05-06 NOTE — PDOC PROGRESS REPORT
Subjective


Progress Note for:: 05/06/20


Subjective:: 





5/2/2020


Patient overall doing quite well today.  She is having a great deal of pain 

during her dressing changes but otherwise her pain is well controlled.  We will 

start transitioning her to oral pain medications rather than IV and I 

recommended that these be given her approximately 30 minutes prior to her 

dressing change.  She is interested in starting a vegan diet and we spent a 

great deal of time talking about this yesterday.  She has no new complaints.  

Surgery has signed off.  We are waiting for her to be accepted at a nursing 

facility for wound care.





5/3/2020


Patient overall doing quite well other than having some new itching in her 

wounds which she associates with the healing process.  PRN Benadryl has been 

ordered for this.  Lotions have thus far been unsuccessful per nursing.  Pain is

well controlled for the most part when she is not having dressing changes.  

Blood pressure elevated and her medications have been increased today.  We await

placement for her to SNF.  We will need to check her coronavirus test which is 

required for admission to any of the local nursing facilities.





5/5/2020


We are still waiting on SNF placement for patient.  She was going to go to 

facility yesterday but her bed was given back to patient that was already st

aying there.  I have prescribed her metformin that she can start taking after 

discharge.  Her wound is healing gradually and appears to have clean pink 

healthy tissue.  Greatly concerned that the patient will pass stool onto her 

wound and have a recurrence of her abscess/sepsis/infection.  She needs to be 

sent to a SNF rather than home with home health as she requires an enormous 

amount of assistance due to the size and location of her wound in relation to 

her massive body size that limits her ability to clean and care for herself.  

She is not  and only has a 13-year-old daughter at home for the patient 

states is unable to care for her wounds.  Patient has no specific complaints 

today other than some mild itching at the wound intermittently which is relieved

by Benadryl.





5/6/2020


Patient rather upset during our encounter this morning due to her wound packing 

falling into the toilet while she was having a large bowel movement this 

morning.  She was upset that the nurse removed the packing and then used a wipe 

to clean her wound.  Patient states her wound as a burning sensation from the 

cleaning, call and request that her current nurse come in and irrigate the 

wound.  I discussed this with the patient's nurse today who agreed to clean the 

wound and put new dressings on it.  I authorized an additional dose of morphine 

for this extra dressing change given the patient has severe pain whenever her 

dressings are removed.  We are still looking for a nursing facility to accept 

the patient.  Patient has no other complaints.


Reason For Visit: 


SEPSIS,BUTTOCKS ABSCESS








Physical Exam


Vital Signs: 


                                        











Temp Pulse Resp BP Pulse Ox


 


 97.6 F   107 H  18   127/51 H  98 


 


 05/06/20 11:47  05/06/20 11:47  05/06/20 11:47  05/06/20 11:47  05/06/20 11:47








                                 Intake & Output











 05/05/20 05/06/20 05/07/20





 06:59 06:59 06:59


 


Intake Total 1570 645 


 


Balance 1570 645 


 


Weight 201.6 kg 201 kg 











General appearance: PRESENT: no acute distress, morbidly obese, obese


Head exam: PRESENT: atraumatic, normocephalic


Eye exam: PRESENT: conjunctiva pink


Mouth exam: PRESENT: moist


Respiratory exam: PRESENT: clear to auscultation viridiana.  ABSENT: rales, rhonchi, 

wheezes


GI/Abdominal exam: PRESENT: normal bowel sounds, soft.  ABSENT: distended, gu

arding, mass, organolmegaly, rebound, tenderness


Neurological exam: PRESENT: alert, awake, oriented to person, oriented to place,

oriented to time, oriented to situation


Psychiatric exam: PRESENT: appropriate affect, normal mood


Skin exam: PRESENT: dry, warm, other - Surgical wound very tender today but 

still well appearing and healing





Results


Laboratory Results: 


                                        





                                 05/01/20 05:49 





                                 05/01/20 05:49 








Impressions: 


                                        





Extremity Ultrasound  04/24/20 05:34


IMPRESSION:


 


Moderate soft tissue edema/cellulitis of the right gluteal


region. Limitation.


 














Assessment and Plan





- Diagnosis


(1) Perirectal abscess


Is this a current diagnosis for this admission?: Yes   


Plan: 


Now status post surgical I&D by Dr. Smith.


Wound culture shows gram-negative rods gram-positive cocci, and gram-positive 

rods


Blood culture (1 of 4 bottles) shows gram-positive cocci in clusters. 


WBCs trending down





Surgery is consulted; wound care per their expertise.


Patient is admitted to the medical floor.


She has been empirically placed on IV vancomycin and Zosyn.


Antiemetics and analgesics as needed.


Discharge planning is consulted





4/27 Will change abx to Ceftriaxone





4/29 awaiting evaluation by surgery for possible intervention again today 

patient remains n.p.o.





5/1/2020


Her extensive wounds will require a great deal of care after discharge.  She 

will be unable to care for her own wounds to her severe obesity and the 

extensive nature of her wounds.  She will need to go to nursing facility but we 

have yet to find one that accepts her.


Continued on ceftriaxone





5/2/2020


Pain seems to be only uncontrolled during dressing changes.  Transition oral 

narcotics and off of IV options.


Surgery has signed off


Needs to go to SNF for wound care, not accepted yet





5/3/2020


Patient having a great deal of itching at her wounds which she associates with 

healing otherwise pain is very well controlled she is comfortable


As needed Benadryl added





5/5/2020


Requires great deal of help caring for this wound and keeping stool out of the 

wound, require SNF placement and there is no bed available


Antibiotics continue





5/6/2020


Wound dressings fell into the toilet during a large BM and unclear if there was

any exposure of the wound to stool.  Wound is been cleaned and redressed twice 

since then.


Continue pain management local wound care


Still looking for SNF that can provide wound care








(2) Hypertension


Is this a current diagnosis for this admission?: Yes   





(3) Morbid obesity with BMI of 70 and over, adult


Is this a current diagnosis for this admission?: Yes   





(4) Necrotizing soft tissue infection


Is this a current diagnosis for this admission?: Yes   





(5) Prediabetes


Is this a current diagnosis for this admission?: Yes   





(6) Sepsis


Qualifiers: 


   Sepsis type: sepsis due to unspecified organism   Sepsis acute organ 

dysfunction status: unspecified   Qualified Code(s): A41.9 - Sepsis, unspecified

organism   


Is this a current diagnosis for this admission?: Yes   





(7) Depression


Is this a current diagnosis for this admission?: Yes   





- Plan Summary


Summary: 


Wound culture is yielding multiple organisms including gram-positive cocci in 

chains, Clostridium non-perfringens, E. coli and she had a blood culture that 

grew Staphylococcus in clusters.  Blood culture has been negative.  There was 

just 1 blood culture out of 4+ possibly contaminant.  Looking at a profile of 

antibiotic sensitivity including E. coli I think ceftriaxone is probably a good 

choice as this will also cover the multiple gram-positive organisms.  I will 

change antibiotics to ceftriaxone.  Her white count is down to 14,000





4/28 count is slightly elevated however patient remains clinically stable.  I 

did change to ceftriaxone yesterday.  Cultures do seem to be susceptible to 

that.  I will hold off on changing antibiotics and reevaluate tomorrow.  There 

is also the possibility that she may go to the OR as per surgery





4/29 WBC improved, continue same abx





4/30Status post wide debridement of ischial abscess.  Wound apparently doing 

better and no need for wound VAC or for the OR intervention at this time.  

Patient will need to go to rehab as she will need wound care which she cannot 

provide a currently provided by family.  She is still on IV antibiotics although

she has improved with a white count coming down from 29.2-12.6.  Wound culture 

is yielding multiple organisms.  Patient is currently on ceftriaxone but I 

believe this can be changed to oral antibiotics in a few days and probably 

discharge to rehab early next week s











- Time


Time Spent with patient: 15-24 minutes


Medications reviewed and adjusted accordingly: Yes


Anticipated discharge: SNF





- Inpatient Certification


Based on my medical assessment, after consideration of the patient's 

comorbidities, presenting symptoms, or acuity I expect that the services needed 

warrant INPATIENT care.: Yes


I certify that my determination is in accordance with my understanding of 

Medicare's requirements for reasonable and necessary INPATIENT services [42 CFR 

412.3e].: Yes


Medical Necessity: Significant Comorbidiites Make Outpatient Treatment Too 

Risky, Need Close Monitoring Due to Risk of Patient Decompensation, Risk of 

Complication if Not Cared For in Hospital, Risk of Diagnosis Which Will Require 

Inpatient Eval/Care/Monitoring

## 2020-05-07 RX ADMIN — FAMOTIDINE SCH MG: 20 TABLET, FILM COATED ORAL at 22:11

## 2020-05-07 RX ADMIN — CHLORTHALIDONE SCH MG: 25 TABLET ORAL at 10:37

## 2020-05-07 RX ADMIN — NYSTATIN CREAM SCH APPLIC: 100000 CREAM TOPICAL at 10:37

## 2020-05-07 RX ADMIN — METOPROLOL TARTRATE SCH MG: 25 TABLET, FILM COATED ORAL at 10:37

## 2020-05-07 RX ADMIN — MORPHINE SULFATE PRN MG: 10 INJECTION INTRAMUSCULAR; INTRAVENOUS; SUBCUTANEOUS at 16:46

## 2020-05-07 RX ADMIN — DOCUSATE SODIUM SCH: 100 CAPSULE, LIQUID FILLED ORAL at 17:57

## 2020-05-07 RX ADMIN — ESCITALOPRAM OXALATE SCH MG: 10 TABLET, FILM COATED ORAL at 10:36

## 2020-05-07 RX ADMIN — INSULIN LISPRO SCH: 100 INJECTION, SOLUTION INTRAVENOUS; SUBCUTANEOUS at 08:02

## 2020-05-07 RX ADMIN — NIFEDIPINE SCH MG: 30 TABLET, FILM COATED, EXTENDED RELEASE ORAL at 10:36

## 2020-05-07 RX ADMIN — MORPHINE SULFATE PRN MG: 10 INJECTION INTRAMUSCULAR; INTRAVENOUS; SUBCUTANEOUS at 08:32

## 2020-05-07 RX ADMIN — MORPHINE SULFATE PRN MG: 10 INJECTION INTRAMUSCULAR; INTRAVENOUS; SUBCUTANEOUS at 12:01

## 2020-05-07 RX ADMIN — DOCUSATE SODIUM SCH: 100 CAPSULE, LIQUID FILLED ORAL at 10:22

## 2020-05-07 RX ADMIN — FAMOTIDINE SCH MG: 20 TABLET, FILM COATED ORAL at 10:37

## 2020-05-07 RX ADMIN — HEPARIN SODIUM SCH UNIT: 5000 INJECTION, SOLUTION INTRAVENOUS; SUBCUTANEOUS at 22:11

## 2020-05-07 RX ADMIN — INSULIN LISPRO SCH: 100 INJECTION, SOLUTION INTRAVENOUS; SUBCUTANEOUS at 22:12

## 2020-05-07 RX ADMIN — METOPROLOL TARTRATE SCH MG: 25 TABLET, FILM COATED ORAL at 22:10

## 2020-05-07 RX ADMIN — NYSTATIN CREAM SCH APPLIC: 100000 CREAM TOPICAL at 22:11

## 2020-05-07 RX ADMIN — INSULIN LISPRO SCH: 100 INJECTION, SOLUTION INTRAVENOUS; SUBCUTANEOUS at 17:57

## 2020-05-07 RX ADMIN — INSULIN LISPRO SCH: 100 INJECTION, SOLUTION INTRAVENOUS; SUBCUTANEOUS at 12:22

## 2020-05-07 RX ADMIN — HEPARIN SODIUM SCH UNIT: 5000 INJECTION, SOLUTION INTRAVENOUS; SUBCUTANEOUS at 14:08

## 2020-05-07 RX ADMIN — IBUPROFEN SCH MG: 800 TABLET, FILM COATED ORAL at 08:02

## 2020-05-07 RX ADMIN — DIPHENHYDRAMINE HYDROCHLORIDE PRN MG: 25 CAPSULE ORAL at 17:55

## 2020-05-07 RX ADMIN — DIPHENHYDRAMINE HYDROCHLORIDE PRN MG: 25 CAPSULE ORAL at 10:36

## 2020-05-07 RX ADMIN — IBUPROFEN SCH: 800 TABLET, FILM COATED ORAL at 12:23

## 2020-05-07 RX ADMIN — LISINOPRIL SCH MG: 10 TABLET ORAL at 10:36

## 2020-05-07 RX ADMIN — IBUPROFEN SCH MG: 800 TABLET, FILM COATED ORAL at 17:52

## 2020-05-07 RX ADMIN — HEPARIN SODIUM SCH UNIT: 5000 INJECTION, SOLUTION INTRAVENOUS; SUBCUTANEOUS at 06:20

## 2020-05-07 NOTE — PDOC PROGRESS REPORT
Subjective


Progress Note for:: 05/07/20


Subjective:: 





5/2/2020


Patient overall doing quite well today.  She is having a great deal of pain 

during her dressing changes but otherwise her pain is well controlled.  We will 

start transitioning her to oral pain medications rather than IV and I 

recommended that these be given her approximately 30 minutes prior to her 

dressing change.  She is interested in starting a vegan diet and we spent a 

great deal of time talking about this yesterday.  She has no new complaints.  

Surgery has signed off.  We are waiting for her to be accepted at a nursing 

facility for wound care.





5/3/2020


Patient overall doing quite well other than having some new itching in her 

wounds which she associates with the healing process.  PRN Benadryl has been 

ordered for this.  Lotions have thus far been unsuccessful per nursing.  Pain is

well controlled for the most part when she is not having dressing changes.  

Blood pressure elevated and her medications have been increased today.  We await

placement for her to SNF.  We will need to check her coronavirus test which is 

required for admission to any of the local nursing facilities.





5/5/2020


We are still waiting on SNF placement for patient.  She was going to go to 

facility yesterday but her bed was given back to patient that was already st

aying there.  I have prescribed her metformin that she can start taking after 

discharge.  Her wound is healing gradually and appears to have clean pink 

healthy tissue.  Greatly concerned that the patient will pass stool onto her 

wound and have a recurrence of her abscess/sepsis/infection.  She needs to be 

sent to a SNF rather than home with home health as she requires an enormous 

amount of assistance due to the size and location of her wound in relation to 

her massive body size that limits her ability to clean and care for herself.  

She is not  and only has a 13-year-old daughter at home for the patient 

states is unable to care for her wounds.  Patient has no specific complaints 

today other than some mild itching at the wound intermittently which is relieved

by Benadryl.





5/6/2020


Patient rather upset during our encounter this morning due to her wound packing 

falling into the toilet while she was having a large bowel movement this 

morning.  She was upset that the nurse removed the packing and then used a wipe 

to clean her wound.  Patient states her wound as a burning sensation from the 

cleaning, call and request that her current nurse come in and irrigate the 

wound.  I discussed this with the patient's nurse today who agreed to clean the 

wound and put new dressings on it.  I authorized an additional dose of morphine 

for this extra dressing change given the patient has severe pain whenever her 

dressings are removed.  We are still looking for a nursing facility to accept 

the patient.  Patient has no other complaints.





5/7/2020


Patient doing much better today compared to yesterday.  States her wound is less

painful since her nurse Debbie irrigated it with saline yesterday.  She is 

ambulating a bit more around the halls but still requires a great deal of 

assistance with her wound care she has no new complaints today.  We are still 

waiting for a bed at a SNF..


Reason For Visit: 


SEPSIS,BUTTOCKS ABSCESS








Physical Exam


Vital Signs: 


                                        











Temp Pulse Resp BP Pulse Ox


 


 98.2 F   83   16   106/52 L  100 


 


 05/07/20 09:14  05/07/20 09:14  05/07/20 09:14  05/07/20 09:14  05/07/20 09:14








                                 Intake & Output











 05/06/20 05/07/20 05/08/20





 06:59 06:59 06:59


 


Intake Total 645 1040 


 


Balance 645 1040 


 


Weight 201 kg 197.9 kg 











General appearance: PRESENT: no acute distress, well-developed, well-nourished


Head exam: PRESENT: atraumatic, normocephalic


Eye exam: PRESENT: conjunctiva pink


Mouth exam: PRESENT: moist


Respiratory exam: PRESENT: clear to auscultation viridiana.  ABSENT: rales, rhonchi, 

wheezes


GI/Abdominal exam: PRESENT: normal bowel sounds, soft.  ABSENT: distended, 

guarding, mass, organolmegaly, rebound, tenderness


Neurological exam: PRESENT: alert, awake, oriented to person, oriented to place,

oriented to time, oriented to situation


Psychiatric exam: PRESENT: appropriate affect, normal mood


Skin exam: PRESENT: dry, warm, other - Buttock wound appears to be healing and 

is quite well-appearing.  No drainage or pus but 





Results


Laboratory Results: 


                                        





                                 05/01/20 05:49 





                                 05/01/20 05:49 








Impressions: 


                                        





Extremity Ultrasound  04/24/20 05:34


IMPRESSION:


 


Moderate soft tissue edema/cellulitis of the right gluteal


region. Limitation.


 














Assessment and Plan





- Diagnosis


(1) Perirectal abscess


Is this a current diagnosis for this admission?: Yes   





(2) Hypertension


Is this a current diagnosis for this admission?: Yes   





(3) Morbid obesity with BMI of 70 and over, adult


Is this a current diagnosis for this admission?: Yes   





(4) Necrotizing soft tissue infection


Is this a current diagnosis for this admission?: Yes   





(5) Prediabetes


Is this a current diagnosis for this admission?: Yes   





(6) Sepsis


Qualifiers: 


   Sepsis type: sepsis due to unspecified organism   Sepsis acute organ 

dysfunction status: unspecified   Qualified Code(s): A41.9 - Sepsis, unspecified

organism   


Is this a current diagnosis for this admission?: Yes   





(7) Depression


Is this a current diagnosis for this admission?: Yes   





- Plan Summary


Summary: 


Wound culture is yielding multiple organisms including gram-positive cocci in 

chains, Clostridium non-perfringens, E. coli and she had a blood culture that 

grew Staphylococcus in clusters.  Blood culture has been negative.  There was 

just 1 blood culture out of 4+ possibly contaminant.  Looking at a profile of 

antibiotic sensitivity including E. coli I think ceftriaxone is probably a good 

choice as this will also cover the multiple gram-positive organisms.  I will 

change antibiotics to ceftriaxone.  Her white count is down to 14,000





4/28 count is slightly elevated however patient remains clinically stable.  I 

did change to ceftriaxone yesterday.  Cultures do seem to be susceptible to 

that.  I will hold off on changing antibiotics and reevaluate tomorrow.  There 

is also the possibility that she may go to the OR as per surgery





4/29 WBC improved, continue same abx





4/30Status post wide debridement of ischial abscess.  Wound apparently doing 

better and no need for wound VAC or for the OR intervention at this time.  

Patient will need to go to rehab as she will need wound care which she cannot 

provide a currently provided by family.  She is still on IV antibiotics although

she has improved with a white count coming down from 29.2-12.6.  Wound culture 

is yielding multiple organisms.  Patient is currently on ceftriaxone but I 

believe this can be changed to oral antibiotics in a few days and probably 

discharge to rehab early next week s











- Time


Time Spent with patient: 15-24 minutes


Medications reviewed and adjusted accordingly: Yes


Anticipated discharge: SNF





- Inpatient Certification


Based on my medical assessment, after consideration of the patient's 

comorbidities, presenting symptoms, or acuity I expect that the services needed 

warrant INPATIENT care.: Yes


I certify that my determination is in accordance with my understanding of 

Medicare's requirements for reasonable and necessary INPATIENT services [42 CFR 

412.3e].: Yes


Medical Necessity: Risk of Complication if Not Cared For in Hospital

## 2020-05-08 RX ADMIN — FAMOTIDINE SCH MG: 20 TABLET, FILM COATED ORAL at 22:25

## 2020-05-08 RX ADMIN — NYSTATIN CREAM SCH APPLIC: 100000 CREAM TOPICAL at 23:10

## 2020-05-08 RX ADMIN — INSULIN LISPRO SCH: 100 INJECTION, SOLUTION INTRAVENOUS; SUBCUTANEOUS at 12:17

## 2020-05-08 RX ADMIN — NYSTATIN CREAM SCH APPLIC: 100000 CREAM TOPICAL at 09:53

## 2020-05-08 RX ADMIN — OXYCODONE AND ACETAMINOPHEN PRN TAB: 5; 325 TABLET ORAL at 16:41

## 2020-05-08 RX ADMIN — MORPHINE SULFATE PRN MG: 10 INJECTION INTRAMUSCULAR; INTRAVENOUS; SUBCUTANEOUS at 09:52

## 2020-05-08 RX ADMIN — MORPHINE SULFATE PRN MG: 10 INJECTION INTRAMUSCULAR; INTRAVENOUS; SUBCUTANEOUS at 00:38

## 2020-05-08 RX ADMIN — MORPHINE SULFATE PRN MG: 10 INJECTION INTRAMUSCULAR; INTRAVENOUS; SUBCUTANEOUS at 14:02

## 2020-05-08 RX ADMIN — HEPARIN SODIUM SCH UNIT: 5000 INJECTION, SOLUTION INTRAVENOUS; SUBCUTANEOUS at 05:18

## 2020-05-08 RX ADMIN — FAMOTIDINE SCH MG: 20 TABLET, FILM COATED ORAL at 09:53

## 2020-05-08 RX ADMIN — CHLORTHALIDONE SCH MG: 25 TABLET ORAL at 09:53

## 2020-05-08 RX ADMIN — LISINOPRIL SCH MG: 10 TABLET ORAL at 09:53

## 2020-05-08 RX ADMIN — MORPHINE SULFATE PRN MG: 10 INJECTION INTRAMUSCULAR; INTRAVENOUS; SUBCUTANEOUS at 20:17

## 2020-05-08 RX ADMIN — DIPHENHYDRAMINE HYDROCHLORIDE PRN MG: 25 CAPSULE ORAL at 20:37

## 2020-05-08 RX ADMIN — HEPARIN SODIUM SCH UNIT: 5000 INJECTION, SOLUTION INTRAVENOUS; SUBCUTANEOUS at 22:26

## 2020-05-08 RX ADMIN — INSULIN LISPRO SCH: 100 INJECTION, SOLUTION INTRAVENOUS; SUBCUTANEOUS at 22:17

## 2020-05-08 RX ADMIN — HEPARIN SODIUM SCH UNIT: 5000 INJECTION, SOLUTION INTRAVENOUS; SUBCUTANEOUS at 13:55

## 2020-05-08 RX ADMIN — ESCITALOPRAM OXALATE SCH MG: 10 TABLET, FILM COATED ORAL at 09:53

## 2020-05-08 RX ADMIN — DOCUSATE SODIUM SCH: 100 CAPSULE, LIQUID FILLED ORAL at 09:51

## 2020-05-08 RX ADMIN — IBUPROFEN SCH MG: 800 TABLET, FILM COATED ORAL at 08:18

## 2020-05-08 RX ADMIN — IBUPROFEN SCH MG: 800 TABLET, FILM COATED ORAL at 16:37

## 2020-05-08 RX ADMIN — DIPHENHYDRAMINE HYDROCHLORIDE PRN MG: 25 CAPSULE ORAL at 10:19

## 2020-05-08 RX ADMIN — INSULIN LISPRO SCH: 100 INJECTION, SOLUTION INTRAVENOUS; SUBCUTANEOUS at 08:05

## 2020-05-08 RX ADMIN — DOCUSATE SODIUM SCH: 100 CAPSULE, LIQUID FILLED ORAL at 17:34

## 2020-05-08 RX ADMIN — METOPROLOL TARTRATE SCH MG: 25 TABLET, FILM COATED ORAL at 09:53

## 2020-05-08 RX ADMIN — METOPROLOL TARTRATE SCH MG: 25 TABLET, FILM COATED ORAL at 22:25

## 2020-05-08 RX ADMIN — NIFEDIPINE SCH MG: 30 TABLET, FILM COATED, EXTENDED RELEASE ORAL at 09:53

## 2020-05-08 RX ADMIN — IBUPROFEN SCH MG: 800 TABLET, FILM COATED ORAL at 12:18

## 2020-05-08 RX ADMIN — INSULIN LISPRO SCH: 100 INJECTION, SOLUTION INTRAVENOUS; SUBCUTANEOUS at 16:27

## 2020-05-08 NOTE — PDOC PROGRESS REPORT
Subjective


Progress Note for:: 05/08/20


Subjective:: 


ELVIRA BARKER is a 34 year old female with a past medical history of 

hypertension, depression, and morbid obesity who was admitted 4/24/2020 with a 

perirectal abscess requiring surgical intervention.





Patient was seen on morning rounds.  She is found resting in bed on room air.  

Currently in prone position, breathing without difficulty, while having wound 

care.


She reports continued wound discomfort.  


Otherwise, she denies fever, chills, chest pain, palpitations, dyspnea, 

abdominal pain, nausea.  


She has no other questions or concerns at this time.


Nursing reports that patient was incontinent of urine.  Advised patient to begin

a toileting schedule to prevent further episodes.  Also recommended that the 

patient began discussing with her friends and family members a potential 

discharge to home.  Encouraged her to find someone who with whom she would feel 

comfortable providing wound care so that they could be invited into the hospital

for teaching.


Reason For Visit: 


SEPSIS,BUTTOCKS ABSCESS








Physical Exam


Vital Signs: 


                                        











Temp Pulse Resp BP Pulse Ox


 


 98.1 F   67   16   99/52 L  97 


 


 05/08/20 15:27  05/08/20 15:27  05/08/20 15:27  05/08/20 15:27  05/08/20 15:27








                                 Intake & Output











 05/07/20 05/08/20 05/09/20





 06:59 06:59 06:59


 


Intake Total 1040 1150 240


 


Balance 1040 1150 240


 


Weight 197.9 kg 197.9 kg 











General appearance: PRESENT: no acute distress, cooperative, morbidly obese, 

well-developed, well-nourished


Head exam: PRESENT: atraumatic, normocephalic


Eye exam: PRESENT: conjunctiva pink, EOMI, PERRLA.  ABSENT: scleral icterus


Mouth exam: PRESENT: moist, tongue midline


Respiratory exam: PRESENT: clear to auscultation viridiana.  ABSENT: rales, rhonchi, 

wheezes


Pulses: PRESENT: normal dorsalis pedis pul


Vascular exam: PRESENT: normal capillary refill


Extremities exam: PRESENT: full ROM.  ABSENT: calf tenderness, clubbing, pedal 

edema


Musculoskeletal exam: PRESENT: ambulatory


Neurological exam: PRESENT: alert, awake, oriented to person, oriented to place,

oriented to time, oriented to situation, CN II-XII grossly intact.  ABSENT: 

motor sensory deficit


Psychiatric exam: PRESENT: appropriate affect, normal mood.  ABSENT: homicidal 

ideation, suicidal ideation


Skin exam: PRESENT: dry, warm, other - Large buttocks wound; granulation tissue 

noted.  No surrounding erythema or drainage present.  Appears to be healing 

well..  ABSENT: cyanosis, rash





Results


Laboratory Results: 


                                        





                                 05/01/20 05:49 





                                 05/01/20 05:49 








Impressions: 


                                        





Extremity Ultrasound  04/24/20 05:34


IMPRESSION:


 


Moderate soft tissue edema/cellulitis of the right gluteal


region. Limitation.


 














Assessment and Plan





- Diagnosis


(1) Perirectal abscess


Is this a current diagnosis for this admission?: Yes   


Plan: 


Now status post surgical I&D by Dr. Smith.


Wound culture shows gram-negative rods gram-positive cocci, and gram-positive 

rods


Blood culture (1 of 4 bottles) shows gram-positive cocci in clusters. 


WBCs trending down





Surgery is consulted; wound care per their expertise.


Patient is admitted to the medical floor.


She has been empirically placed on IV vancomycin and Zosyn.


Antiemetics and analgesics as needed.


Discharge planning is consulted





4/27 Will change abx to Ceftriaxone





4/29 awaiting evaluation by surgery for possible intervention again today 

patient remains n.p.o.





5/1/2020


Her extensive wounds will require a great deal of care after discharge.  She 

will be unable to care for her own wounds to her severe obesity and the 

extensive nature of her wounds.  She will need to go to nursing facility but we 

have yet to find one that accepts her.


Continued on ceftriaxone





5/2/2020


Pain seems to be only uncontrolled during dressing changes.  Transition oral 

narcotics and off of IV options.


Surgery has signed off


Needs to go to SNF for wound care, not accepted yet





5/3/2020


Patient having a great deal of itching at her wounds which she associates with 

healing otherwise pain is very well controlled she is comfortable


As needed Benadryl added





5/5/2020


Requires great deal of help caring for this wound and keeping stool out of the 

wound, require SNF placement and there is no bed available


Antibiotics continue





5/6/2020


Wound dressings fell into the toilet during a large BM and unclear if there was

any exposure of the wound to stool.  Wound is been cleaned and redressed twice 

since then.


Continue pain management local wound care


Still looking for SNF that can provide wound care





5/8/2020


Healing well.


Continue wound care as directed by surgery.








(2) Hypertension


Is this a current diagnosis for this admission?: Yes   


Plan: 


Continue home dose Lisinopril.


IV hydralazine as needed for blood pressure control.


Appropriate pain control.


Cardiac diet.





5/1/2020


BP uncontrolled


IV hydralazine stopped, we can control her blood pressure with orals rather 

than IV PRN medications that can cause abrupt unpredictable drops


Increase lisinopril dose


Start chlorthalidone





5/2/2020


Uncontrolled, increased meds, added lisinopril





5/8/2020


Improved BP control


Continue Lisinopril, Metoprolol, Nifedipine, and Chlorthalidone


Cardiac diet








(3) Depression


Is this a current diagnosis for this admission?: Yes   


Plan: 


Continue home dose Lexapro.








(4) Morbid obesity with BMI of 60.0-69.9, adult


Is this a current diagnosis for this admission?: Yes   


Plan: 


BMI 62.6


The patient's super morbid obesity requires additional nursing support.  


Bariatric bed.


She is at risk for further wound breakdown and/or respiratory complications.


Lifestyle modification dietary discretion are strongly advised.


TSH and lipid panel are acceptable other than low HDL.


A1c 6.6%


Registered dietitian is consulted.








(5) Prediabetes


Is this a current diagnosis for this admission?: Yes   


Plan: 


A1c 6.6%.


Discussed with patient has previously been on metformin.  We will plan on 

resuming at discharge.


While admitted, will provide Accu-Cheks before meals and at bedtime with Humalog

for sliding scale coverage.


Registered dietitian is consulted.








- Plan Summary


Summary: 


Wound culture is yielding multiple organisms including gram-positive cocci in 

chains, Clostridium non-perfringens, E. coli and she had a blood culture that 

grew Staphylococcus in clusters.  Blood culture has been negative.  There was 

just 1 blood culture out of 4+ possibly contaminant.  Looking at a profile of 

antibiotic sensitivity including E. coli I think ceftriaxone is probably a good 

choice as this will also cover the multiple gram-positive organisms.  I will 

change antibiotics to ceftriaxone.  Her white count is down to 14,000





4/28 count is slightly elevated however patient remains clinically stable.  I 

did change to ceftriaxone yesterday.  Cultures do seem to be susceptible to 

that.  I will hold off on changing antibiotics and reevaluate tomorrow.  There 

is also the possibility that she may go to the OR as per surgery





4/29 WBC improved, continue same abx





4/30Status post wide debridement of ischial abscess.  Wound apparently doing 

better and no need for wound VAC or for the OR intervention at this time.  

Patient will need to go to rehab as she will need wound care which she cannot 

provide a currently provided by family.  She is still on IV antibiotics although

she has improved with a white count coming down from 29.2-12.6.  Wound culture 

is yielding multiple organisms.  Patient is currently on ceftriaxone but I 

believe this can be changed to oral antibiotics in a few days and probably d

ischarge to rehab early next week s











- Time


Time Spent with patient: 15-24 minutes


Medications reviewed and adjusted accordingly: Yes


Anticipated discharge: SNF


Within: when bed available

## 2020-05-09 RX ADMIN — IBUPROFEN SCH MG: 800 TABLET, FILM COATED ORAL at 17:14

## 2020-05-09 RX ADMIN — HEPARIN SODIUM SCH UNIT: 5000 INJECTION, SOLUTION INTRAVENOUS; SUBCUTANEOUS at 22:25

## 2020-05-09 RX ADMIN — INSULIN LISPRO SCH: 100 INJECTION, SOLUTION INTRAVENOUS; SUBCUTANEOUS at 22:26

## 2020-05-09 RX ADMIN — INSULIN LISPRO SCH: 100 INJECTION, SOLUTION INTRAVENOUS; SUBCUTANEOUS at 10:56

## 2020-05-09 RX ADMIN — DIPHENHYDRAMINE HYDROCHLORIDE PRN MG: 25 CAPSULE ORAL at 03:35

## 2020-05-09 RX ADMIN — DIPHENHYDRAMINE HYDROCHLORIDE PRN MG: 25 CAPSULE ORAL at 20:32

## 2020-05-09 RX ADMIN — INSULIN LISPRO SCH: 100 INJECTION, SOLUTION INTRAVENOUS; SUBCUTANEOUS at 17:01

## 2020-05-09 RX ADMIN — IBUPROFEN SCH MG: 800 TABLET, FILM COATED ORAL at 13:23

## 2020-05-09 RX ADMIN — MORPHINE SULFATE PRN MG: 10 INJECTION INTRAMUSCULAR; INTRAVENOUS; SUBCUTANEOUS at 20:33

## 2020-05-09 RX ADMIN — MORPHINE SULFATE PRN MG: 10 INJECTION INTRAMUSCULAR; INTRAVENOUS; SUBCUTANEOUS at 23:53

## 2020-05-09 RX ADMIN — MORPHINE SULFATE PRN MG: 10 INJECTION INTRAMUSCULAR; INTRAVENOUS; SUBCUTANEOUS at 11:42

## 2020-05-09 RX ADMIN — MORPHINE SULFATE PRN MG: 10 INJECTION INTRAMUSCULAR; INTRAVENOUS; SUBCUTANEOUS at 06:20

## 2020-05-09 RX ADMIN — FAMOTIDINE SCH MG: 20 TABLET, FILM COATED ORAL at 22:26

## 2020-05-09 RX ADMIN — INSULIN LISPRO SCH: 100 INJECTION, SOLUTION INTRAVENOUS; SUBCUTANEOUS at 07:47

## 2020-05-09 RX ADMIN — HEPARIN SODIUM SCH UNIT: 5000 INJECTION, SOLUTION INTRAVENOUS; SUBCUTANEOUS at 06:05

## 2020-05-09 RX ADMIN — NYSTATIN CREAM SCH: 100000 CREAM TOPICAL at 10:42

## 2020-05-09 RX ADMIN — METOPROLOL TARTRATE SCH MG: 25 TABLET, FILM COATED ORAL at 10:42

## 2020-05-09 RX ADMIN — CHLORTHALIDONE SCH MG: 25 TABLET ORAL at 10:42

## 2020-05-09 RX ADMIN — MORPHINE SULFATE PRN MG: 10 INJECTION INTRAMUSCULAR; INTRAVENOUS; SUBCUTANEOUS at 01:14

## 2020-05-09 RX ADMIN — LISINOPRIL SCH MG: 10 TABLET ORAL at 10:42

## 2020-05-09 RX ADMIN — NIFEDIPINE SCH: 30 TABLET, FILM COATED, EXTENDED RELEASE ORAL at 10:01

## 2020-05-09 RX ADMIN — FAMOTIDINE SCH MG: 20 TABLET, FILM COATED ORAL at 10:42

## 2020-05-09 RX ADMIN — NYSTATIN CREAM SCH APPLIC: 100000 CREAM TOPICAL at 22:27

## 2020-05-09 RX ADMIN — METOPROLOL TARTRATE SCH MG: 25 TABLET, FILM COATED ORAL at 22:25

## 2020-05-09 RX ADMIN — OXYCODONE AND ACETAMINOPHEN PRN TAB: 5; 325 TABLET ORAL at 03:35

## 2020-05-09 RX ADMIN — IBUPROFEN SCH MG: 800 TABLET, FILM COATED ORAL at 07:53

## 2020-05-09 RX ADMIN — ESCITALOPRAM OXALATE SCH MG: 10 TABLET, FILM COATED ORAL at 10:42

## 2020-05-09 RX ADMIN — DOCUSATE SODIUM SCH: 100 CAPSULE, LIQUID FILLED ORAL at 17:01

## 2020-05-09 RX ADMIN — HEPARIN SODIUM SCH UNIT: 5000 INJECTION, SOLUTION INTRAVENOUS; SUBCUTANEOUS at 13:23

## 2020-05-09 RX ADMIN — DOCUSATE SODIUM SCH: 100 CAPSULE, LIQUID FILLED ORAL at 09:34

## 2020-05-09 NOTE — PDOC PROGRESS REPORT
Subjective


Subjective:: 





5/2/2020


Patient overall doing quite well today.  She is having a great deal of pain 

during her dressing changes but otherwise her pain is well controlled.  We will 

start transitioning her to oral pain medications rather than IV and I 

recommended that these be given her approximately 30 minutes prior to her 

dressing change.  She is interested in starting a vegan diet and we spent a 

great deal of time talking about this yesterday.  She has no new complaints.  

Surgery has signed off.  We are waiting for her to be accepted at a nursing 

facility for wound care.





5/3/2020


Patient overall doing quite well other than having some new itching in her 

wounds which she associates with the healing process.  PRN Benadryl has been 

ordered for this.  Lotions have thus far been unsuccessful per nursing.  Pain is

well controlled for the most part when she is not having dressing changes.  

Blood pressure elevated and her medications have been increased today.  We await

placement for her to SNF.  We will need to check her coronavirus test which is 

required for admission to any of the local nursing facilities.





5/5/2020


We are still waiting on SNF placement for patient.  She was going to go to 

facility yesterday but her bed was given back to patient that was already 

staying there.  I have prescribed her metformin that she can start taking after 

discharge.  Her wound is healing gradually and appears to have clean pink he

althy tissue.  Greatly concerned that the patient will pass stool onto her wound

and have a recurrence of her abscess/sepsis/infection.  She needs to be sent to 

a SNF rather than home with home health as she requires an enormous amount of 

assistance due to the size and location of her wound in relation to her massive 

body size that limits her ability to clean and care for herself.  She is not 

 and only has a 13-year-old daughter at home for the patient states is 

unable to care for her wounds.  Patient has no specific complaints today other 

than some mild itching at the wound intermittently which is relieved by 

Benadryl.





5/6/2020


Patient rather upset during our encounter this morning due to her wound packing 

falling into the toilet while she was having a large bowel movement this mo

rning.  She was upset that the nurse removed the packing and then used a wipe to

clean her wound.  Patient states her wound as a burning sensation from the 

cleaning, call and request that her current nurse come in and irrigate the 

wound.  I discussed this with the patient's nurse today who agreed to clean the 

wound and put new dressings on it.  I authorized an additional dose of morphine 

for this extra dressing change given the patient has severe pain whenever her 

dressings are removed.  We are still looking for a nursing facility to accept 

the patient.  Patient has no other complaints.





5/7/2020


Patient doing much better today compared to yesterday.  States her wound is less

painful since her nurse Debbie irrigated it with saline yesterday.  She is 

ambulating a bit more around the halls but still requires a great deal of 

assistance with her wound care she has no new complaints today.  We are still 

waiting for a bed at a SNF..





5/9/2020


Patient seems to be doing well today overall but she is having some new pain and

discomfort in her groin area due to what appears to be a fungal infection.  

Nystatin cream is causing a burning sensation per patient and I have discussed 

with the nurse to switch this to a powder.  I will also give her a one-time dose

of Diflucan oral today to help this clear up more quickly.  Her wound looks good

today and is still continuing to heal slowly.  Still no word on if she is going 

to a SNF anytime soon.


Reason For Visit: 


SEPSIS,BUTTOCKS ABSCESS








Physical Exam


Vital Signs: 


                                        











Temp Pulse Resp BP Pulse Ox


 


 97.5 F   89   18   93/76 L  98 


 


 05/09/20 12:00  05/09/20 12:00  05/09/20 12:00  05/09/20 12:00  05/09/20 12:00








                                 Intake & Output











 05/08/20 05/09/20 05/10/20





 06:59 06:59 06:59


 


Intake Total 1150 1162 500


 


Balance 1150 1162 500


 


Weight 197.9 kg 197.4 kg 











General appearance: PRESENT: morbidly obese


Head exam: PRESENT: atraumatic, normocephalic


Eye exam: PRESENT: conjunctiva pink, EOMI, PERRLA.  ABSENT: scleral icterus


Mouth exam: PRESENT: moist


Respiratory exam: PRESENT: clear to auscultation viridiana.  ABSENT: rales, rhonchi, 

wheezes


Cardiovascular exam: PRESENT: RRR.  ABSENT: diastolic murmur, rubs, systolic 

murmur


GI/Abdominal exam: PRESENT: normal bowel sounds, soft.  ABSENT: distended, 

guarding, mass, organolmegaly, rebound, tenderness


Neurological exam: PRESENT: alert, awake, oriented to person, oriented to place,

oriented to time, oriented to situation


Psychiatric exam: PRESENT: appropriate affect, normal mood


Skin exam: PRESENT: dry, warm, other - Buttock wound well-appearing and healing 

slowly.  Diffuse cutaneous fungal infection on scalp, pannus, groin area





Results


Laboratory Results: 


                                        





                                 05/01/20 05:49 





                                 05/01/20 05:49 








Impressions: 


                                        





Extremity Ultrasound  04/24/20 05:34


IMPRESSION:


 


Moderate soft tissue edema/cellulitis of the right gluteal


region. Limitation.


 














Assessment and Plan





- Diagnosis


(1) Perirectal abscess


Is this a current diagnosis for this admission?: Yes   





(2) Hypertension


Is this a current diagnosis for this admission?: Yes   





(3) Morbid obesity with BMI of 70 and over, adult


Is this a current diagnosis for this admission?: Yes   





(4) Necrotizing soft tissue infection


Is this a current diagnosis for this admission?: Yes   





(5) Prediabetes


Is this a current diagnosis for this admission?: Yes   





(6) Sepsis


Qualifiers: 


   Sepsis type: sepsis due to unspecified organism   Sepsis acute organ dysf

unction status: unspecified   Qualified Code(s): A41.9 - Sepsis, unspecified 

organism   


Is this a current diagnosis for this admission?: Yes   





(7) Depression


Is this a current diagnosis for this admission?: Yes   





(8) Cutaneous candidiasis


Is this a current diagnosis for this admission?: Yes   


Plan: 





Likely exacerbated by systemic antibiotics


Nystatin powder


Fluconazole 150 mg x 1 dose








- Plan Summary


Summary: 


Wound culture is yielding multiple organisms including gram-positive cocci in 

chains, Clostridium non-perfringens, E. coli and she had a blood culture that 

grew Staphylococcus in clusters.  Blood culture has been negative.  There was 

just 1 blood culture out of 4+ possibly contaminant.  Looking at a profile of 

antibiotic sensitivity including E. coli I think ceftriaxone is probably a good 

choice as this will also cover the multiple gram-positive organisms.  I will 

change antibiotics to ceftriaxone.  Her white count is down to 14,000





4/28 count is slightly elevated however patient remains clinically stable.  I 

did change to ceftriaxone yesterday.  Cultures do seem to be susceptible to 

that.  I will hold off on changing antibiotics and reevaluate tomorrow.  There i

s also the possibility that she may go to the OR as per surgery





4/29 WBC improved, continue same abx





4/30Status post wide debridement of ischial abscess.  Wound apparently doing 

better and no need for wound VAC or for the OR intervention at this time.  

Patient will need to go to rehab as she will need wound care which she cannot 

provide a currently provided by family.  She is still on IV antibiotics although

she has improved with a white count coming down from 29.2-12.6.  Wound culture 

is yielding multiple organisms.  Patient is currently on ceftriaxone but I 

believe this can be changed to oral antibiotics in a few days and probably 

discharge to rehab early next week s











- Time


Time Spent with patient: 15-24 minutes


Medications reviewed and adjusted accordingly: Yes


Anticipated discharge: Home





- Inpatient Certification


Based on my medical assessment, after consideration of the patient's 

comorbidities, presenting symptoms, or acuity I expect that the services needed 

warrant INPATIENT care.: Yes


I certify that my determination is in accordance with my understanding of 

Medicare's requirements for reasonable and necessary INPATIENT services [42 CFR 

412.3e].: Yes


Medical Necessity: Significant Comorbidiites Make Outpatient Treatment Too 

Risky, Need Close Monitoring Due to Risk of Patient Decompensation, Need for IV 

Antibiotics, Risk of Complication if Not Cared For in Hospital, Risk of 

Diagnosis Which Will Require Inpatient Eval/Care/Monitoring

## 2020-05-10 RX ADMIN — NYSTATIN CREAM SCH APPLIC: 100000 CREAM TOPICAL at 09:56

## 2020-05-10 RX ADMIN — INSULIN LISPRO SCH: 100 INJECTION, SOLUTION INTRAVENOUS; SUBCUTANEOUS at 22:18

## 2020-05-10 RX ADMIN — DIPHENHYDRAMINE HYDROCHLORIDE PRN MG: 25 CAPSULE ORAL at 09:55

## 2020-05-10 RX ADMIN — HEPARIN SODIUM SCH UNIT: 5000 INJECTION, SOLUTION INTRAVENOUS; SUBCUTANEOUS at 05:41

## 2020-05-10 RX ADMIN — NIFEDIPINE SCH MG: 30 TABLET, FILM COATED, EXTENDED RELEASE ORAL at 09:55

## 2020-05-10 RX ADMIN — MORPHINE SULFATE PRN MG: 10 INJECTION INTRAMUSCULAR; INTRAVENOUS; SUBCUTANEOUS at 23:37

## 2020-05-10 RX ADMIN — FAMOTIDINE SCH MG: 20 TABLET, FILM COATED ORAL at 22:29

## 2020-05-10 RX ADMIN — HEPARIN SODIUM SCH UNIT: 5000 INJECTION, SOLUTION INTRAVENOUS; SUBCUTANEOUS at 14:55

## 2020-05-10 RX ADMIN — INSULIN LISPRO SCH: 100 INJECTION, SOLUTION INTRAVENOUS; SUBCUTANEOUS at 16:45

## 2020-05-10 RX ADMIN — METOPROLOL TARTRATE SCH MG: 25 TABLET, FILM COATED ORAL at 22:28

## 2020-05-10 RX ADMIN — IBUPROFEN SCH MG: 800 TABLET, FILM COATED ORAL at 11:38

## 2020-05-10 RX ADMIN — DIPHENHYDRAMINE HYDROCHLORIDE PRN MG: 25 CAPSULE ORAL at 17:47

## 2020-05-10 RX ADMIN — CHLORTHALIDONE SCH MG: 25 TABLET ORAL at 09:56

## 2020-05-10 RX ADMIN — METOPROLOL TARTRATE SCH MG: 25 TABLET, FILM COATED ORAL at 09:56

## 2020-05-10 RX ADMIN — DOCUSATE SODIUM SCH MG: 100 CAPSULE, LIQUID FILLED ORAL at 09:55

## 2020-05-10 RX ADMIN — MORPHINE SULFATE PRN MG: 10 INJECTION INTRAMUSCULAR; INTRAVENOUS; SUBCUTANEOUS at 08:01

## 2020-05-10 RX ADMIN — LISINOPRIL SCH MG: 10 TABLET ORAL at 09:55

## 2020-05-10 RX ADMIN — NYSTATIN SCH: 100000 POWDER TOPICAL at 17:42

## 2020-05-10 RX ADMIN — FAMOTIDINE SCH MG: 20 TABLET, FILM COATED ORAL at 09:56

## 2020-05-10 RX ADMIN — DOCUSATE SODIUM SCH MG: 100 CAPSULE, LIQUID FILLED ORAL at 17:42

## 2020-05-10 RX ADMIN — OXYCODONE AND ACETAMINOPHEN PRN TAB: 5; 325 TABLET ORAL at 22:22

## 2020-05-10 RX ADMIN — INSULIN LISPRO SCH: 100 INJECTION, SOLUTION INTRAVENOUS; SUBCUTANEOUS at 11:38

## 2020-05-10 RX ADMIN — HEPARIN SODIUM SCH UNIT: 5000 INJECTION, SOLUTION INTRAVENOUS; SUBCUTANEOUS at 22:28

## 2020-05-10 RX ADMIN — ESCITALOPRAM OXALATE SCH MG: 10 TABLET, FILM COATED ORAL at 09:56

## 2020-05-10 RX ADMIN — IBUPROFEN SCH MG: 800 TABLET, FILM COATED ORAL at 08:01

## 2020-05-10 RX ADMIN — IBUPROFEN SCH MG: 800 TABLET, FILM COATED ORAL at 17:42

## 2020-05-10 RX ADMIN — OXYCODONE AND ACETAMINOPHEN PRN TAB: 5; 325 TABLET ORAL at 14:55

## 2020-05-10 RX ADMIN — INSULIN LISPRO SCH UNIT: 100 INJECTION, SOLUTION INTRAVENOUS; SUBCUTANEOUS at 08:08

## 2020-05-10 RX ADMIN — MORPHINE SULFATE PRN MG: 10 INJECTION INTRAMUSCULAR; INTRAVENOUS; SUBCUTANEOUS at 17:48

## 2020-05-10 RX ADMIN — MORPHINE SULFATE PRN MG: 10 INJECTION INTRAMUSCULAR; INTRAVENOUS; SUBCUTANEOUS at 12:42

## 2020-05-10 NOTE — PDOC PROGRESS REPORT
Subjective


Progress Note for:: 05/10/20


Subjective:: 





5/2/2020


Patient overall doing quite well today.  She is having a great deal of pain 

during her dressing changes but otherwise her pain is well controlled.  We will 

start transitioning her to oral pain medications rather than IV and I 

recommended that these be given her approximately 30 minutes prior to her 

dressing change.  She is interested in starting a vegan diet and we spent a 

great deal of time talking about this yesterday.  She has no new complaints.  

Surgery has signed off.  We are waiting for her to be accepted at a nursing 

facility for wound care.





5/3/2020


Patient overall doing quite well other than having some new itching in her 

wounds which she associates with the healing process.  PRN Benadryl has been 

ordered for this.  Lotions have thus far been unsuccessful per nursing.  Pain is

well controlled for the most part when she is not having dressing changes.  

Blood pressure elevated and her medications have been increased today.  We await

placement for her to SNF.  We will need to check her coronavirus test which is 

required for admission to any of the local nursing facilities.





5/5/2020


We are still waiting on SNF placement for patient.  She was going to go to 

facility yesterday but her bed was given back to patient that was already st

aying there.  I have prescribed her metformin that she can start taking after 

discharge.  Her wound is healing gradually and appears to have clean pink 

healthy tissue.  Greatly concerned that the patient will pass stool onto her 

wound and have a recurrence of her abscess/sepsis/infection.  She needs to be 

sent to a SNF rather than home with home health as she requires an enormous 

amount of assistance due to the size and location of her wound in relation to 

her massive body size that limits her ability to clean and care for herself.  

She is not  and only has a 13-year-old daughter at home for the patient 

states is unable to care for her wounds.  Patient has no specific complaints 

today other than some mild itching at the wound intermittently which is relieved

by Benadryl.





5/6/2020


Patient rather upset during our encounter this morning due to her wound packing 

falling into the toilet while she was having a large bowel movement this 

morning.  She was upset that the nurse removed the packing and then used a wipe 

to clean her wound.  Patient states her wound as a burning sensation from the 

cleaning, call and request that her current nurse come in and irrigate the 

wound.  I discussed this with the patient's nurse today who agreed to clean the 

wound and put new dressings on it.  I authorized an additional dose of morphine 

for this extra dressing change given the patient has severe pain whenever her 

dressings are removed.  We are still looking for a nursing facility to accept 

the patient.  Patient has no other complaints.





5/7/2020


Patient doing much better today compared to yesterday.  States her wound is less

painful since her nurse Debbie irrigated it with saline yesterday.  She is 

ambulating a bit more around the halls but still requires a great deal of 

assistance with her wound care she has no new complaints today.  We are still 

waiting for a bed at a SNF..





5/9/2020


Patient seems to be doing well today overall but she is having some new pain and

discomfort in her groin area due to what appears to be a fungal infection.  

Nystatin cream is causing a burning sensation per patient and I have discussed 

with the nurse to switch this to a powder.  I will also give her a one-time dose

of Diflucan oral today to help this clear up more quickly.  Her wound looks good

today and is still continuing to heal slowly.  Still no word on if she is going 

to a SNF anytime soon.





5/10/2020


Rather severe cutaneous yeast infection seems to be improving patient notes that

the itching is much improved.  We will continue this every 72 hours for a total 

of 3 doses.  She will need to follow-up with dermatology outpatient for this and

her chronic eczema.  She has no new complaints today.  We are awaiting placement

at nursing facility.


Reason For Visit: 


SEPSIS,BUTTOCKS ABSCESS








Physical Exam


Vital Signs: 


                                        











Temp Pulse Resp BP Pulse Ox


 


 97.6 F   73   18   110/53 L  98 


 


 05/10/20 15:31  05/10/20 15:31  05/10/20 15:31  05/10/20 15:31  05/10/20 15:31








                                 Intake & Output











 05/09/20 05/10/20 05/11/20





 06:59 06:59 06:59


 


Intake Total 1162 1620 620


 


Balance 1162 1620 620


 


Weight 197.4 kg 197.4 kg 











General appearance: PRESENT: no acute distress, well-developed, well-nourished


Head exam: PRESENT: atraumatic, normocephalic


Eye exam: PRESENT: conjunctiva pink


Mouth exam: PRESENT: moist


Respiratory exam: PRESENT: clear to auscultation viridiana.  ABSENT: rales, rhonchi, 

wheezes


Cardiovascular exam: PRESENT: RRR.  ABSENT: diastolic murmur, rubs, systolic 

murmur


GI/Abdominal exam: PRESENT: normal bowel sounds, soft.  ABSENT: distended, 

guarding, mass, organolmegaly, rebound, tenderness


Neurological exam: PRESENT: alert, awake, oriented to person, oriented to place,

oriented to time, oriented to situation


Skin exam: PRESENT: dry, rash - Candidal rash on scalp and pannus/groin seems to

be a bit better today, warm, other - Buttock wound well-appearing and healing 

today





Results


Laboratory Results: 


                                        





                                 05/01/20 05:49 





                                 05/01/20 05:49 








Impressions: 


                                        





Extremity Ultrasound  04/24/20 05:34


IMPRESSION:


 


Moderate soft tissue edema/cellulitis of the right gluteal


region. Limitation.


 














Assessment and Plan





- Diagnosis


(1) Perirectal abscess


Is this a current diagnosis for this admission?: Yes   





(2) Hypertension


Is this a current diagnosis for this admission?: Yes   





(3) Morbid obesity with BMI of 70 and over, adult


Is this a current diagnosis for this admission?: Yes   





(4) Necrotizing soft tissue infection


Is this a current diagnosis for this admission?: Yes   





(5) Prediabetes


Is this a current diagnosis for this admission?: Yes   





(6) Sepsis


Qualifiers: 


   Sepsis type: sepsis due to unspecified organism   Sepsis acute organ 

dysfunction status: unspecified   Qualified Code(s): A41.9 - Sepsis, unspecified

organism   


Is this a current diagnosis for this admission?: Yes   





(7) Depression


Is this a current diagnosis for this admission?: Yes   





(8) Cutaneous candidiasis


Is this a current diagnosis for this admission?: Yes   


Plan: 





Likely exacerbated by systemic antibiotics


Nystatin powder


Fluconazole 150 mg to be given every 72 hours for 3 doses total








- Plan Summary


Summary: 


Wound culture is yielding multiple organisms including gram-positive cocci in 

chains, Clostridium non-perfringens, E. coli and she had a blood culture that 

grew Staphylococcus in clusters.  Blood culture has been negative.  There was 

just 1 blood culture out of 4+ possibly contaminant.  Looking at a profile of 

antibiotic sensitivity including E. coli I think ceftriaxone is probably a good 

choice as this will also cover the multiple gram-positive organisms.  I will 

change antibiotics to ceftriaxone.  Her white count is down to 14,000





4/28 count is slightly elevated however patient remains clinically stable.  I 

did change to ceftriaxone yesterday.  Cultures do seem to be susceptible to 

that.  I will hold off on changing antibiotics and reevaluate tomorrow.  There 

is also the possibility that she may go to the OR as per surgery





4/29 WBC improved, continue same abx





4/30Status post wide debridement of ischial abscess.  Wound apparently doing 

better and no need for wound VAC or for the OR intervention at this time.  

Patient will need to go to rehab as she will need wound care which she cannot 

provide a currently provided by family.  She is still on IV antibiotics although

she has improved with a white count coming down from 29.2-12.6.  Wound culture 

is yielding multiple organisms.  Patient is currently on ceftriaxone but I 

believe this can be changed to oral antibiotics in a few days and probably 

discharge to rehab early next week s











- Inpatient Certification


Based on my medical assessment, after consideration of the patient's 

comorbidities, presenting symptoms, or acuity I expect that the services needed 

warrant INPATIENT care.: Yes


I certify that my determination is in accordance with my understanding of 

Medicare's requirements for reasonable and necessary INPATIENT services [42 CFR 

412.3e].: Yes


Medical Necessity: Significant Comorbidiites Make Outpatient Treatment Too 

Risky, Need Close Monitoring Due to Risk of Patient Decompensation, Risk of 

Complication if Not Cared For in Hospital, Risk of Diagnosis Which Will Require 

Inpatient Eval/Care/Monitoring

## 2020-05-11 LAB
ADD MANUAL DIFF: NO
ANION GAP SERPL CALC-SCNC: 6 MMOL/L (ref 5–19)
ANION GAP SERPL CALC-SCNC: 8 MMOL/L (ref 5–19)
APPEARANCE UR: (no result)
APTT PPP: YELLOW S
BASOPHILS # BLD AUTO: 0.1 10^3/UL (ref 0–0.2)
BASOPHILS NFR BLD AUTO: 0.9 % (ref 0–2)
BILIRUB UR QL STRIP: NEGATIVE
BUN SERPL-MCNC: 44 MG/DL (ref 7–20)
BUN SERPL-MCNC: 47 MG/DL (ref 7–20)
CALCIUM: 9.2 MG/DL (ref 8.4–10.2)
CALCIUM: 9.8 MG/DL (ref 8.4–10.2)
CHLORIDE SERPL-SCNC: 101 MMOL/L (ref 98–107)
CHLORIDE SERPL-SCNC: 103 MMOL/L (ref 98–107)
CO2 SERPL-SCNC: 26 MMOL/L (ref 22–30)
CO2 SERPL-SCNC: 27 MMOL/L (ref 22–30)
EOSINOPHIL # BLD AUTO: 0.5 10^3/UL (ref 0–0.6)
EOSINOPHIL NFR BLD AUTO: 8.5 % (ref 0–6)
ERYTHROCYTE [DISTWIDTH] IN BLOOD BY AUTOMATED COUNT: 18.5 % (ref 11.5–14)
GLUCOSE SERPL-MCNC: 107 MG/DL (ref 75–110)
GLUCOSE SERPL-MCNC: 112 MG/DL (ref 75–110)
GLUCOSE UR STRIP-MCNC: NEGATIVE MG/DL
HCT VFR BLD CALC: 33.7 % (ref 36–47)
HGB BLD-MCNC: 11.1 G/DL (ref 12–15.5)
KETONES UR STRIP-MCNC: NEGATIVE MG/DL
LYMPHOCYTES # BLD AUTO: 2.8 10^3/UL (ref 0.5–4.7)
LYMPHOCYTES NFR BLD AUTO: 43.7 % (ref 13–45)
MCH RBC QN AUTO: 24.7 PG (ref 27–33.4)
MCHC RBC AUTO-ENTMCNC: 33 G/DL (ref 32–36)
MCV RBC AUTO: 75 FL (ref 80–97)
MONOCYTES # BLD AUTO: 0.6 10^3/UL (ref 0.1–1.4)
MONOCYTES NFR BLD AUTO: 9.7 % (ref 3–13)
NEUTROPHILS # BLD AUTO: 2.3 10^3/UL (ref 1.7–8.2)
NEUTS SEG NFR BLD AUTO: 37.2 % (ref 42–78)
PH UR STRIP: 5 [PH] (ref 5–9)
PLATELET # BLD: 556 10^3/UL (ref 150–450)
POTASSIUM SERPL-SCNC: 5.3 MMOL/L (ref 3.6–5)
POTASSIUM SERPL-SCNC: 5.8 MMOL/L (ref 3.6–5)
PROT UR STRIP-MCNC: NEGATIVE MG/DL
RBC # BLD AUTO: 4.5 10^6/UL (ref 3.72–5.28)
SP GR UR STRIP: 1.01
TOTAL CELLS COUNTED % (AUTO): 100 %
UROBILINOGEN UR-MCNC: NEGATIVE MG/DL (ref ?–2)
WBC # BLD AUTO: 6.3 10^3/UL (ref 4–10.5)

## 2020-05-11 RX ADMIN — Medication SCH APPLIC: at 17:50

## 2020-05-11 RX ADMIN — FAMOTIDINE SCH MG: 20 TABLET, FILM COATED ORAL at 22:21

## 2020-05-11 RX ADMIN — DIPHENHYDRAMINE HYDROCHLORIDE PRN MG: 25 CAPSULE ORAL at 22:21

## 2020-05-11 RX ADMIN — INSULIN LISPRO SCH: 100 INJECTION, SOLUTION INTRAVENOUS; SUBCUTANEOUS at 08:05

## 2020-05-11 RX ADMIN — OXYCODONE AND ACETAMINOPHEN PRN TAB: 5; 325 TABLET ORAL at 17:48

## 2020-05-11 RX ADMIN — INSULIN LISPRO SCH: 100 INJECTION, SOLUTION INTRAVENOUS; SUBCUTANEOUS at 12:20

## 2020-05-11 RX ADMIN — HEPARIN SODIUM SCH: 5000 INJECTION, SOLUTION INTRAVENOUS; SUBCUTANEOUS at 13:03

## 2020-05-11 RX ADMIN — MORPHINE SULFATE PRN MG: 10 INJECTION INTRAMUSCULAR; INTRAVENOUS; SUBCUTANEOUS at 15:36

## 2020-05-11 RX ADMIN — HEPARIN SODIUM SCH UNIT: 5000 INJECTION, SOLUTION INTRAVENOUS; SUBCUTANEOUS at 22:29

## 2020-05-11 RX ADMIN — DOCUSATE SODIUM SCH MG: 100 CAPSULE, LIQUID FILLED ORAL at 17:49

## 2020-05-11 RX ADMIN — SODIUM CHLORIDE PRN MLS/HR: 9 INJECTION, SOLUTION INTRAVENOUS at 15:36

## 2020-05-11 RX ADMIN — OXYCODONE AND ACETAMINOPHEN PRN TAB: 5; 325 TABLET ORAL at 22:20

## 2020-05-11 RX ADMIN — DIPHENHYDRAMINE HYDROCHLORIDE PRN MG: 25 CAPSULE ORAL at 06:14

## 2020-05-11 RX ADMIN — METOPROLOL TARTRATE SCH MG: 25 TABLET, FILM COATED ORAL at 10:26

## 2020-05-11 RX ADMIN — NIFEDIPINE SCH MG: 30 TABLET, FILM COATED, EXTENDED RELEASE ORAL at 10:29

## 2020-05-11 RX ADMIN — INSULIN LISPRO SCH: 100 INJECTION, SOLUTION INTRAVENOUS; SUBCUTANEOUS at 21:59

## 2020-05-11 RX ADMIN — IBUPROFEN SCH MG: 800 TABLET, FILM COATED ORAL at 08:05

## 2020-05-11 RX ADMIN — INSULIN LISPRO SCH UNIT: 100 INJECTION, SOLUTION INTRAVENOUS; SUBCUTANEOUS at 17:48

## 2020-05-11 RX ADMIN — NYSTATIN SCH APPLIC: 100000 POWDER TOPICAL at 17:49

## 2020-05-11 RX ADMIN — DOCUSATE SODIUM SCH MG: 100 CAPSULE, LIQUID FILLED ORAL at 10:26

## 2020-05-11 RX ADMIN — FAMOTIDINE SCH MG: 20 TABLET, FILM COATED ORAL at 10:26

## 2020-05-11 RX ADMIN — ESCITALOPRAM OXALATE SCH MG: 10 TABLET, FILM COATED ORAL at 10:26

## 2020-05-11 RX ADMIN — NYSTATIN SCH APPLIC: 100000 POWDER TOPICAL at 10:28

## 2020-05-11 RX ADMIN — HEPARIN SODIUM SCH: 5000 INJECTION, SOLUTION INTRAVENOUS; SUBCUTANEOUS at 06:11

## 2020-05-11 RX ADMIN — OXYCODONE AND ACETAMINOPHEN PRN TAB: 5; 325 TABLET ORAL at 06:14

## 2020-05-11 RX ADMIN — MORPHINE SULFATE PRN MG: 10 INJECTION INTRAMUSCULAR; INTRAVENOUS; SUBCUTANEOUS at 08:15

## 2020-05-11 RX ADMIN — Medication SCH: at 23:56

## 2020-05-11 RX ADMIN — METOPROLOL TARTRATE SCH MG: 25 TABLET, FILM COATED ORAL at 22:21

## 2020-05-11 NOTE — PDOC PROGRESS REPORT
Subjective


Progress Note for:: 05/11/20


Subjective:: 


ELVIRA BARKER is a 34 year old female with a past medical history of 

hypertension, depression, and morbid obesity who was admitted 4/24/2020 with a 

perirectal abscess requiring surgical intervention.





Patient was seen on morning rounds.  She is found resting in bed on room air.  

Currently in prone position, breathing without difficulty.


She reports continued wound discomfort; concerned about pain management on oral 

medications post d/c. 


Also reports dry, flaky, skin to face similar to prior eczema flares.


She denies fever, chills, chest pain, palpitations, dyspnea, abdominal pain, 

nausea.  


She has no other questions or concerns at this time.


Reason For Visit: 


SEPSIS,BUTTOCKS ABSCESS








Physical Exam


Vital Signs: 


                                        











Temp Pulse Resp BP Pulse Ox


 


 97.6 F   71   17   131/55 H  99 


 


 05/11/20 12:00  05/11/20 12:00  05/11/20 12:00  05/11/20 12:00  05/11/20 12:00








                                 Intake & Output











 05/10/20 05/11/20 05/12/20





 06:59 06:59 06:59


 


Intake Total 5025 658 [id number]


 


Balance 0697 194 8762


 


Weight 197.4 kg 197.4 kg 











General appearance: PRESENT: no acute distress, cooperative, morbidly obese, 

well-developed, well-nourished


Head exam: PRESENT: atraumatic, normocephalic


Eye exam: PRESENT: conjunctiva pink, EOMI, PERRLA.  ABSENT: scleral icterus


Mouth exam: PRESENT: moist, tongue midline


Respiratory exam: PRESENT: clear to auscultation viridiana, symmetrical, unlabored.  

ABSENT: rales, rhonchi, wheezes


Cardiovascular exam: PRESENT: RRR


Pulses: PRESENT: normal dorsalis pedis pul


Vascular exam: PRESENT: normal capillary refill


Extremities exam: PRESENT: full ROM.  ABSENT: calf tenderness, clubbing, pedal 

edema


Neurological exam: PRESENT: alert, awake, oriented to person, oriented to place,

oriented to time, oriented to situation, CN II-XII grossly intact.  ABSENT: 

motor sensory deficit


Psychiatric exam: PRESENT: appropriate affect, normal mood.  ABSENT: homicidal 

ideation, suicidal ideation


Skin exam: PRESENT: dry, warm.  ABSENT: cyanosis, intact - buttocks wound s/p 

surgical debridement.  Clean, dry, dressing in place, rash





Results


Laboratory Results: 


                                        





                                 05/11/20 05:03 





                                 05/11/20 13:54 





                                        











  05/11/20 05/11/20 05/11/20





  05:03 05:03 13:54


 


WBC  6.3  


 


RBC  4.50  


 


Hgb  11.1 L  


 


Hct  33.7 L  


 


MCV  75 L  


 


MCH  24.7 L  


 


MCHC  33.0  


 


RDW  18.5 H  


 


Plt Count  556 H  


 


Seg Neutrophils %  37.2 L  


 


Sodium   136.1 L  135.1 L


 


Potassium   5.8 H  5.3 H


 


Chloride   101  103


 


Carbon Dioxide   27  26


 


Anion Gap   8  6


 


BUN   47 H  44 H


 


Creatinine   2.19 H  1.97 H


 


Est GFR ( Amer)   31 L  35 L


 


Glucose   112 H  107


 


Calcium   9.8  9.2











Impressions: 


                                        





Extremity Ultrasound  04/24/20 05:34


IMPRESSION:


 


Moderate soft tissue edema/cellulitis of the right gluteal


region. Limitation.


 














Assessment and Plan





- Diagnosis


(1) KOSTA (acute kidney injury)


Is this a current diagnosis for this admission?: Yes   


Plan: 


Likely secondary to lisinopril and chlorthalidone (both new medications started 

this admission)


Both placed on Hold.


1 L NS bolus.


Follow up BMP slightly improved.


Continue generous IVF.


Encourage p.o. fluids.


Avoid nephrotoxic medications as able.


Follow-up chemistry.








(2) Hyperkalemia


Is this a current diagnosis for this admission?: Yes   


Plan: 


Likely secondary to KOSTA.


Lactulose x1.


IV fluids as above.


Follow-up chemistry.








(3) Perirectal abscess


Is this a current diagnosis for this admission?: Yes   


Plan: 


Now status post surgical I&D by Dr. Smith.


Wound culture w/ polymicrobial infection


Blood culture (1 of 4 bottles) shows Staph hominis. 


Leukocytosis is resolved.





Surgery is consulted; wound care per their expertise.


Patient is admitted to the medical floor.


She has been empirically placed on IV vancomycin and Zosyn.


Antiemetics and analgesics as needed.


Discharge planning is consulted





4/27 Will change abx to Ceftriaxone





4/29 awaiting evaluation by surgery for possible intervention again today 

patient remains n.p.o.





5/1/2020


Her extensive wounds will require a great deal of care after discharge.  She 

will be unable to care for her own wounds to her severe obesity and the 

extensive nature of her wounds.  She will need to go to nursing facility but we 

have yet to find one that accepts her.


Continued on ceftriaxone





5/2/2020


Pain seems to be only uncontrolled during dressing changes.  Transition oral 

narcotics and off of IV options.


Surgery has signed off


Needs to go to SNF for wound care, not accepted yet





5/3/2020


Patient having a great deal of itching at her wounds which she associates with 

healing otherwise pain is very well controlled she is comfortable


As needed Benadryl added





5/5/2020


Requires great deal of help caring for this wound and keeping stool out of the 

wound, require SNF placement and there is no bed available


Antibiotics continue





5/6/2020


Wound dressings fell into the toilet during a large BM and unclear if there was

any exposure of the wound to stool.  Wound is been cleaned and redressed twice 

since then.


Continue pain management local wound care


Still looking for SNF that can provide wound care





5/8/2020


Healing well.


Continue wound care as directed by surgery.





5/11/2020


No changes.


Continue wound care per Surgery's recommendation.


Follow up w/ Surgery within 1 week of d/c to SNF


D/C Planning consulted for SNF placemetn.








(4) Hypertension


Is this a current diagnosis for this admission?: Yes   


Plan: 


Continue home dose Lisinopril.


IV hydralazine as needed for blood pressure control.


Appropriate pain control.


Cardiac diet.





5/1/2020


BP uncontrolled


IV hydralazine stopped, we can control her blood pressure with orals rather 

than IV PRN medications that can cause abrupt unpredictable drops


Increase lisinopril dose


Start chlorthalidone





5/2/2020


Uncontrolled, increased meds, added lisinopril





5/8/2020


Improved BP control


Continue Lisinopril, Metoprolol, Nifedipine, and Chlorthalidone


Cardiac diet





5/11/2020


Lisinopril and Chlorthalidone on hold r/t KOSTA


Continue Metoprolol and Nifedipine


IV Hydralazine prn 


Cardiac diet


Lifestyle modification and dietary discretion strongly advised.  Patient 

unreceptive at this time; concerned about eczema flare and Candida infection.


Unfortunately, patient has actually gained weight this admission.  Will ask 

nursing to take patient to ED for standing scale weight for verification.


Registered dietician is consulted.








(5) Depression


Is this a current diagnosis for this admission?: Yes   


Plan: 


Continue home dose Lexapro.








(6) Prediabetes


Is this a current diagnosis for this admission?: Yes   


Plan: 


A1c 6.6%.


Discussed with patient has previously been on metformin.  We will plan on 

resuming at discharge.


While admitted, will provide Accu-Cheks before meals and at bedtime with Humalog

for sliding scale coverage.


Registered dietitian is consulted.








(7) Morbid obesity with BMI of 70 and over, adult


Is this a current diagnosis for this admission?: Yes   


Plan: 


BMI 72.4


Lifestyle modification and dietary discretion strongly advised.  Patient 

unreceptive at this time; concerned about eczema flare and Candida infection.


Unfortunately, patient has actually gained weight this admission.  Will ask 

nursing to take patient to ED for standing scale weight for verification.


Registered dietician is consulted.








- Plan Summary


Summary: 


Wound culture is yielding multiple organisms including gram-positive cocci in 

chains, Clostridium non-perfringens, E. coli and she had a blood culture that 

grew Staphylococcus in clusters.  Blood culture has been negative.  There was 

just 1 blood culture out of 4+ possibly contaminant.  Looking at a profile of 

antibiotic sensitivity including E. coli I think ceftriaxone is probably a good 

choice as this will also cover the multiple gram-positive organisms.  I will scooby

nge antibiotics to ceftriaxone.  Her white count is down to 14,000





4/28 count is slightly elevated however patient remains clinically stable.  I 

did change to ceftriaxone yesterday.  Cultures do seem to be susceptible to 

that.  I will hold off on changing antibiotics and reevaluate tomorrow.  There 

is also the possibility that she may go to the OR as per surgery





4/29 WBC improved, continue same abx





4/30Status post wide debridement of ischial abscess.  Wound apparently doing 

better and no need for wound VAC or for the OR intervention at this time.  

Patient will need to go to rehab as she will need wound care which she cannot 

provide a currently provided by family.  She is still on IV antibiotics although

she has improved with a white count coming down from 29.2-12.6.  Wound culture 

is yielding multiple organisms.  Patient is currently on ceftriaxone but I 

believe this can be changed to oral antibiotics in a few days and probably 

discharge to rehab early next week s











- Time


Time Spent with patient: 35 or more minutes


Medications reviewed and adjusted accordingly: Yes


Anticipated discharge: SNF


Within: Other - Once renal function stabalizes

## 2020-05-12 LAB
ANION GAP SERPL CALC-SCNC: 6 MMOL/L (ref 5–19)
BUN SERPL-MCNC: 41 MG/DL (ref 7–20)
CALCIUM: 9.5 MG/DL (ref 8.4–10.2)
CHLORIDE SERPL-SCNC: 104 MMOL/L (ref 98–107)
CO2 SERPL-SCNC: 26 MMOL/L (ref 22–30)
GLUCOSE SERPL-MCNC: 110 MG/DL (ref 75–110)
POTASSIUM SERPL-SCNC: 6 MMOL/L (ref 3.6–5)

## 2020-05-12 RX ADMIN — DOCUSATE SODIUM SCH MG: 100 CAPSULE, LIQUID FILLED ORAL at 08:59

## 2020-05-12 RX ADMIN — METOPROLOL TARTRATE SCH MG: 25 TABLET, FILM COATED ORAL at 22:07

## 2020-05-12 RX ADMIN — INSULIN LISPRO SCH: 100 INJECTION, SOLUTION INTRAVENOUS; SUBCUTANEOUS at 21:56

## 2020-05-12 RX ADMIN — HEPARIN SODIUM SCH UNIT: 5000 INJECTION, SOLUTION INTRAVENOUS; SUBCUTANEOUS at 06:40

## 2020-05-12 RX ADMIN — NIFEDIPINE SCH MG: 30 TABLET, FILM COATED, EXTENDED RELEASE ORAL at 09:24

## 2020-05-12 RX ADMIN — DOCUSATE SODIUM SCH: 100 CAPSULE, LIQUID FILLED ORAL at 17:04

## 2020-05-12 RX ADMIN — DIPHENHYDRAMINE HYDROCHLORIDE PRN MG: 25 CAPSULE ORAL at 20:26

## 2020-05-12 RX ADMIN — SODIUM POLYSTYRENE SULFONATE SCH GM: 15 SUSPENSION ORAL; RECTAL at 12:38

## 2020-05-12 RX ADMIN — NYSTATIN SCH APPLIC: 100000 POWDER TOPICAL at 17:21

## 2020-05-12 RX ADMIN — ESCITALOPRAM OXALATE SCH MG: 10 TABLET, FILM COATED ORAL at 09:00

## 2020-05-12 RX ADMIN — OXYCODONE AND ACETAMINOPHEN PRN TAB: 5; 325 TABLET ORAL at 13:52

## 2020-05-12 RX ADMIN — FAMOTIDINE SCH MG: 20 TABLET, FILM COATED ORAL at 22:07

## 2020-05-12 RX ADMIN — OXYCODONE AND ACETAMINOPHEN PRN TAB: 5; 325 TABLET ORAL at 20:26

## 2020-05-12 RX ADMIN — SODIUM CHLORIDE PRN MLS/HR: 9 INJECTION, SOLUTION INTRAVENOUS at 17:21

## 2020-05-12 RX ADMIN — SODIUM CHLORIDE PRN MLS/HR: 9 INJECTION, SOLUTION INTRAVENOUS at 00:15

## 2020-05-12 RX ADMIN — HEPARIN SODIUM SCH: 5000 INJECTION, SOLUTION INTRAVENOUS; SUBCUTANEOUS at 13:10

## 2020-05-12 RX ADMIN — INSULIN LISPRO SCH: 100 INJECTION, SOLUTION INTRAVENOUS; SUBCUTANEOUS at 11:12

## 2020-05-12 RX ADMIN — FAMOTIDINE SCH MG: 20 TABLET, FILM COATED ORAL at 08:59

## 2020-05-12 RX ADMIN — Medication SCH APPLIC: at 17:07

## 2020-05-12 RX ADMIN — HEPARIN SODIUM SCH: 5000 INJECTION, SOLUTION INTRAVENOUS; SUBCUTANEOUS at 22:08

## 2020-05-12 RX ADMIN — METOPROLOL TARTRATE SCH MG: 25 TABLET, FILM COATED ORAL at 09:01

## 2020-05-12 RX ADMIN — NYSTATIN SCH APPLIC: 100000 POWDER TOPICAL at 09:01

## 2020-05-12 RX ADMIN — Medication SCH APPLIC: at 22:08

## 2020-05-12 RX ADMIN — Medication SCH: at 09:02

## 2020-05-12 RX ADMIN — INSULIN LISPRO SCH: 100 INJECTION, SOLUTION INTRAVENOUS; SUBCUTANEOUS at 16:41

## 2020-05-12 RX ADMIN — INSULIN LISPRO SCH: 100 INJECTION, SOLUTION INTRAVENOUS; SUBCUTANEOUS at 08:37

## 2020-05-12 RX ADMIN — SODIUM POLYSTYRENE SULFONATE SCH GM: 15 SUSPENSION ORAL; RECTAL at 08:56

## 2020-05-12 RX ADMIN — Medication SCH APPLIC: at 13:55

## 2020-05-12 RX ADMIN — MORPHINE SULFATE PRN MG: 10 INJECTION INTRAMUSCULAR; INTRAVENOUS; SUBCUTANEOUS at 04:18

## 2020-05-12 RX ADMIN — SODIUM POLYSTYRENE SULFONATE SCH: 15 SUSPENSION ORAL; RECTAL at 17:04

## 2020-05-12 RX ADMIN — MORPHINE SULFATE PRN MG: 10 INJECTION INTRAMUSCULAR; INTRAVENOUS; SUBCUTANEOUS at 22:08

## 2020-05-12 RX ADMIN — Medication SCH APPLIC: at 09:24

## 2020-05-13 VITALS — DIASTOLIC BLOOD PRESSURE: 76 MMHG | SYSTOLIC BLOOD PRESSURE: 141 MMHG

## 2020-05-13 LAB
ANION GAP SERPL CALC-SCNC: 12 MMOL/L (ref 5–19)
BUN SERPL-MCNC: 29 MG/DL (ref 7–20)
CALCIUM: 9.5 MG/DL (ref 8.4–10.2)
CHLORIDE SERPL-SCNC: 102 MMOL/L (ref 98–107)
CO2 SERPL-SCNC: 22 MMOL/L (ref 22–30)
GLUCOSE SERPL-MCNC: 123 MG/DL (ref 75–110)
POTASSIUM SERPL-SCNC: 4.7 MMOL/L (ref 3.6–5)

## 2020-05-13 RX ADMIN — NYSTATIN SCH APPLIC: 100000 POWDER TOPICAL at 09:15

## 2020-05-13 RX ADMIN — SODIUM CHLORIDE PRN MLS/HR: 9 INJECTION, SOLUTION INTRAVENOUS at 02:35

## 2020-05-13 RX ADMIN — NIFEDIPINE SCH MG: 30 TABLET, FILM COATED, EXTENDED RELEASE ORAL at 09:14

## 2020-05-13 RX ADMIN — MORPHINE SULFATE PRN MG: 10 INJECTION INTRAMUSCULAR; INTRAVENOUS; SUBCUTANEOUS at 10:05

## 2020-05-13 RX ADMIN — OXYCODONE AND ACETAMINOPHEN PRN TAB: 5; 325 TABLET ORAL at 15:09

## 2020-05-13 RX ADMIN — DIPHENHYDRAMINE HYDROCHLORIDE PRN MG: 25 CAPSULE ORAL at 05:26

## 2020-05-13 RX ADMIN — ESCITALOPRAM OXALATE SCH MG: 10 TABLET, FILM COATED ORAL at 09:14

## 2020-05-13 RX ADMIN — INSULIN LISPRO SCH: 100 INJECTION, SOLUTION INTRAVENOUS; SUBCUTANEOUS at 07:56

## 2020-05-13 RX ADMIN — DOCUSATE SODIUM SCH: 100 CAPSULE, LIQUID FILLED ORAL at 09:12

## 2020-05-13 RX ADMIN — HEPARIN SODIUM SCH UNIT: 5000 INJECTION, SOLUTION INTRAVENOUS; SUBCUTANEOUS at 05:25

## 2020-05-13 RX ADMIN — SODIUM CHLORIDE PRN MLS/HR: 9 INJECTION, SOLUTION INTRAVENOUS at 10:05

## 2020-05-13 RX ADMIN — FAMOTIDINE SCH MG: 20 TABLET, FILM COATED ORAL at 09:14

## 2020-05-13 RX ADMIN — Medication SCH: at 13:27

## 2020-05-13 RX ADMIN — HEPARIN SODIUM SCH: 5000 INJECTION, SOLUTION INTRAVENOUS; SUBCUTANEOUS at 13:27

## 2020-05-13 RX ADMIN — METOPROLOL TARTRATE SCH MG: 25 TABLET, FILM COATED ORAL at 09:14

## 2020-05-13 RX ADMIN — OXYCODONE AND ACETAMINOPHEN PRN TAB: 5; 325 TABLET ORAL at 06:37

## 2020-05-13 RX ADMIN — INSULIN LISPRO SCH: 100 INJECTION, SOLUTION INTRAVENOUS; SUBCUTANEOUS at 13:27

## 2020-05-13 RX ADMIN — OXYCODONE AND ACETAMINOPHEN PRN TAB: 5; 325 TABLET ORAL at 02:34

## 2020-05-13 RX ADMIN — Medication SCH APPLIC: at 09:15

## 2020-05-13 NOTE — PDOC TRANSFER SUMMARY
Impression





- Admit/DC Date/PCP


Admission Date/Primary Care Provider: 


  04/24/20 09:41





  MARYCRUZ OSEGUERA PA-C





Discharge Date: 05/13/20





- Discharge Diagnosis


(1) Perirectal abscess


Is this a current diagnosis for this admission?: Yes   





(2) Morbid obesity with BMI of 70 and over, adult


Is this a current diagnosis for this admission?: Yes   





(3) Sepsis


Is this a current diagnosis for this admission?: Yes   





(4) Hypertension


Is this a current diagnosis for this admission?: Yes   





(5) Prediabetes


Is this a current diagnosis for this admission?: Yes   





(6) Hyperkalemia


Is this a current diagnosis for this admission?: Yes   





- Assessment


Summary: 


5/12 


Awaiting transfer to skilled nursing facility.  Patient noted to be hypokalemic 

today.  She received adequate potassium lowering agents and will recheck her 

potassium tomorrow.  If stable she can be discharged











- Additional Information


Resuscitation Status: Full Code


Discharge Diet: As Tolerated, Diabetic


Discharge Activity: Activity As Tolerated


Referrals: 


MARYCRUZ OSEGUERA PA-C [Primary Care Provider] - 05/14/20 1:00 pm


Prescriptions: 


Oxycodone HCl/Acetaminophen [Percocet 5-325 mg Tablet] 1 tab PO Q4HP PRN #16 

tablet


 PRN Reason: 


Cephalexin Monohydrate [Keflex 500 mg Capsule] 500 mg PO QID 3 Days #12 capsule


Metformin HCl [Metformin ER Osmotic] 500 mg PO DAILY #30 tab.er.24


Nystatin [Mycostatin Topical Powder 15 gm] 1 applic TP BID #1 bottle


Nifedipine [Procardia XL 30 mg Tablet] 30 mg PO DAILY #30 tab.er.24


Home Medications: 








Escitalopram Oxalate [Lexapro] 10 mg PO DAILY 12/23/11 


Albuterol Sulfate [Proair HFA Inhalation Aerosol 8.5 gm MDI] 2 puff IH Q4HP PRN 

04/24/20 


Cephalexin Monohydrate [Keflex 500 mg Capsule] 500 mg PO QID 3 Days #12 capsule 

05/04/20 


Chlorthalidone [Hygroton 25 mg Tablet] 25 mg PO DAILY  tablet 05/04/20 


Diphenhydramine HCl [Benadryl 25 mg Capsule] 25 mg PO Q6HP PRN  capsule 05/04/20




Docusate Sodium [Colace 100 mg Capsule] 100 mg PO BID  capsule 05/04/20 


Famotidine [Pepcid 20 mg Tablet] 20 mg PO Q12  tablet 05/04/20 


Ibuprofen [Motrin 800 mg Tablet] 800 mg PO Q8HP PRN  tablet 05/04/20 


Lisinopril [Prinivil 10 mg Tablet] 40 mg PO DAILY  tablet 05/04/20 


Mag Hydrox/Al Hydrox/Simeth [Maalox Plus Susp 30 Udcup] 30 ml PO Q6HP PRN  udc 

05/04/20 


Metformin HCl [Metformin ER Osmotic] 500 mg PO DAILY #30 tab.er.24 05/04/20 


Metoprolol Tartrate [Lopressor 25 mg Tablet] 25 mg PO Q12  tablet 05/04/20 


Ondansetron HCl/Pf [Zofran Inj/Pf 4 mg/2 ml Sdv] 4 mg IV Q6HP PRN  vial 05/04/20




Oxycodone HCl/Acetaminophen [Percocet 5-325 mg Tablet] 1 tab PO Q4HP PRN #16 

tablet 05/04/20 


Nifedipine [Procardia XL 30 mg Tablet] 30 mg PO DAILY #30 tab.er.24 05/13/20 


Nystatin [Mycostatin Topical Powder 15 gm] 1 applic TP BID #1 bottle 05/13/20 











History of Present Illiness


History of Present Illness: 


ELVIRA BARKER is a 34 year old female


Patient presents to the hospital with  buttock pain.  She was found to have a 

cellulitis as well as been septic and so was admitted for further management.  

Please see admitting history and physical as well as subsequent consultation by 

general surgery for full details.








Hospital Course


Hospital Course: 








Patient was admitted for management of sepsis secondary to deep tissue 

infection.  She was started on empiric antibiotics.  She was seen by surgery and

she had incision and drainage of the ischial abscess done.  Wound culture 

yielded numerous organisms and patient received a full course of antibiotics 

which has since been discontinued.  He was initially septic but this also 

resolved nicely.





Patient has been hemodynamically stable.  She will need reevaluation of her 

blood pressure and adjustment of her medications for optimal blood pressure 

control.  Patient also counseled on the need to lose weight as she is morbidly 

obese with a BMI of more than 70.  She continues to require wound care which 

will need to be provided at the skilled nursing facility as she is unable to do 

this by herself.















































(1) Perirectal abscess


Is this a current diagnosis for this admission?: Yes   


Plan: 





4/26


Now status post surgical I&D by Dr. Smith.


Wound culture shows gram-negative rods gram-positive cocci, and gram-positive 

rods


Blood culture (1 of 4 bottles) shows gram-positive cocci in clusters. 


WBCs trending down


Surgery is consulted; wound care per their expertise.


Patient is admitted to the medical floor.


She has been empirically placed on IV vancomycin and Zosyn.


Antiemetics and analgesics as needed.


Discharge planning is consulted





4/27 Will change abx to Ceftriaxone





4/29 awaiting evaluation by surgery for possible intervention again today 

patient remains n.p.o.





5/1/2020


Her extensive wounds will require a great deal of care after discharge.  She 

will be unable to care for her own wounds to her severe obesity and the 

extensive nature of her wounds.  She will need to go to nursing facility but we 

have yet to find one that accepts her.


Continued on ceftriaxone





5/2/2020


Pain seems to be only uncontrolled during dressing changes.  Transition oral 

narcotics and off of IV options.


Surgery has signed off


Needs to go to SNF for wound care, not accepted yet





5/3/2020


Patient having a great deal of itching at her wounds which she associates with 

healing otherwise pain is very well controlled she is comfortable


As needed Benadryl added





-Keflex at DC to complete 14d course








(2) Hypertension


Is this a current diagnosis for this admission?: Yes   





(3) Morbid obesity with BMI of 70 and over, adult


Is this a current diagnosis for this admission?: Yes   





(4) Necrotizing soft tissue infection


Is this a current diagnosis for this admission?: Yes   





(5) T2DM


Is this a current diagnosis for this admission?: Yes   


-metformin at DC





(6) Sepsis


Qualifiers: 


   Sepsis type: sepsis due to unspecified organism   Sepsis acute organ 

dysfunction status: unspecified   Qualified Code(s): A41.9 - Sepsis, unspecified

organism   


Is this a current diagnosis for this admission?: Yes   





(7) Depression


Is this a current diagnosis for this admission?: Yes   





Physical Exam


Vital Signs: 


                                        











Temp Pulse Resp BP Pulse Ox


 


 97.9 F   87   12   134/76 H  98 


 


 05/13/20 06:14  05/13/20 06:14  05/13/20 06:14  05/13/20 06:14  05/13/20 06:14








                                 Intake & Output











 05/12/20 05/13/20 05/14/20





 06:59 06:59 06:59


 


Intake Total 3174 8089 800


 


Output Total  3 


 


Balance 3175 3190 800


 


Weight 198.2 kg 198.2 kg 











General appearance: PRESENT: no acute distress, morbidly obese, well-developed, 

well-nourished


Head exam: PRESENT: atraumatic, normocephalic


Eye exam: PRESENT: conjunctiva pink, EOMI, PERRLA.  ABSENT: scleral icterus


Ear exam: PRESENT: normal external ear exam


Mouth exam: PRESENT: moist, tongue midline


Neck exam: ABSENT: carotid bruit, JVD, lymphadenopathy, thyromegaly


Respiratory exam: PRESENT: clear to auscultation viridiana.  ABSENT: rales, rhonchi, 

wheezes


Cardiovascular exam: PRESENT: RRR.  ABSENT: diastolic murmur, rubs, systolic 

murmur


Pulses: PRESENT: normal dorsalis pedis pul


Vascular exam: PRESENT: normal capillary refill


GI/Abdominal exam: PRESENT: normal bowel sounds, soft.  ABSENT: distended, 

guarding, mass, organolmegaly, rebound, tenderness


Rectal exam: PRESENT: deferred


Extremities exam: PRESENT: full ROM.  ABSENT: calf tenderness, clubbing, pedal 

edema


Musculoskeletal exam: PRESENT: other - R ischial area dressing


Neurological exam: PRESENT: alert, awake, oriented to person, oriented to place,

oriented to time, oriented to situation, CN II-XII grossly intact.  ABSENT: 

motor sensory deficit


Psychiatric exam: PRESENT: appropriate affect, normal mood.  ABSENT: homicidal 

ideation, suicidal ideation


Skin exam: PRESENT: dry, intact, warm.  ABSENT: cyanosis, rash





Results


Laboratory Results: 


                                        











WBC  6.3 10^3/uL (4.0-10.5)   05/11/20  05:03    


 


RBC  4.50 10^6/uL (3.72-5.28)   05/11/20  05:03    


 


Hgb  11.1 g/dL (12.0-15.5)  L  05/11/20  05:03    


 


Hct  33.7 % (36.0-47.0)  L  05/11/20  05:03    


 


MCV  75 fl (80-97)  L  05/11/20  05:03    


 


MCH  24.7 pg (27.0-33.4)  L  05/11/20  05:03    


 


MCHC  33.0 g/dL (32.0-36.0)   05/11/20  05:03    


 


RDW  18.5 % (11.5-14.0)  H  05/11/20  05:03    


 


Plt Count  556 10^3/uL (150-450)  H  05/11/20  05:03    


 


Lymph % (Auto)  43.7 % (13-45)   05/11/20  05:03    


 


Mono % (Auto)  9.7 % (3-13)   05/11/20  05:03    


 


Eos % (Auto)  8.5 % (0-6)  H  05/11/20  05:03    


 


Baso % (Auto)  0.9 % (0-2)   05/11/20  05:03    


 


Absolute Neuts (auto)  2.3 10^3/uL (1.7-8.2)   05/11/20  05:03    


 


Absolute Lymphs (auto)  2.8 10^3/uL (0.5-4.7)   05/11/20  05:03    


 


Absolute Monos (auto)  0.6 10^3/uL (0.1-1.4)   05/11/20  05:03    


 


Absolute Eos (auto)  0.5 10^3/uL (0.0-0.6)   05/11/20  05:03    


 


Absolute Basos (auto)  0.1 10^3/uL (0.0-0.2)   05/11/20  05:03    


 


Total Counted  100   04/29/20  05:45    


 


Seg Neutrophils %  37.2 % (42-78)  L  05/11/20  05:03    


 


Seg Neuts % (Manual)  73 % (42-78)   04/29/20  05:45    


 


Band Neutrophils %  6 % (3-5)  H  04/24/20  02:06    


 


Lymphocytes % (Manual)  18 % (13-45)   04/29/20  05:45    


 


Atypical Lymphs %  1 % (0)   04/24/20  02:06    


 


Monocytes % (Manual)  3 % (3-13)   04/29/20  05:45    


 


Eosinophils % (Manual)  5 % (0-6)   04/29/20  05:45    


 


Basophils % (Manual)  1 % (0-2)   04/29/20  05:45    


 


Abs Neuts (Manual)  9.2 10^3/uL (1.7-8.2)  H  04/29/20  05:45    


 


Abs Lymphs (Manual)  2.3 10^3/uL (0.5-4.7)   04/29/20  05:45    


 


Abs Monocytes (Manual)  0.4 10^3/uL (0.1-1.4)   04/29/20  05:45    


 


Absolute Eos (Manual)  0.6 10^3/uL (0.0-0.6)   04/29/20  05:45    


 


Abs Basophils (Manual)  0.1 10^3/uL (0.0-0.2)   04/29/20  05:45    


 


Nucleated RBCs  1 /100 WBC (0)   04/29/20  05:45    


 


Toxic Granulation  1+   04/25/20  05:20    


 


Platelet Comment  ADEQUATE   04/29/20  05:45    


 


Hypochromasia  SLIGHT   04/29/20  05:45    


 


Poikilocytosis  1+   04/25/20  05:20    


 


Anisocytosis  1+   04/29/20  05:45    


 


Microcytosis  1+   04/29/20  05:45    


 


Target Cells  SLIGHT   04/25/20  05:20    


 


Tear Drop Cells  SLIGHT   04/24/20  02:06    


 


Ovalocytes  1+   04/25/20  05:20    


 


PT  15.0 SEC (11.4-15.4)   04/24/20  02:06    


 


INR  1.18   04/24/20  02:06    


 


VBG pH  7.44  (7.30-7.42)  H  04/24/20  02:43    


 


VBG pCO2  38.0 mmHg (35-63)   04/24/20  02:43    


 


VBG HCO3  25.5 mmol/L (20-32)   04/24/20  02:43    


 


VBG Base Excess  1.5 mmol/L  04/24/20  02:43    


 


Sodium  135.7 mmol/L (137-145)  L  05/13/20  07:38    


 


Potassium  4.7 mmol/L (3.6-5.0)   05/13/20  07:38    


 


Chloride  102 mmol/L ()   05/13/20  07:38    


 


Carbon Dioxide  22 mmol/L (22-30)   05/13/20  07:38    


 


Anion Gap  12  (5-19)   05/13/20  07:38    


 


BUN  29 mg/dL (7-20)  H  05/13/20  07:38    


 


Creatinine  1.52 mg/dL (0.52-1.25)  H  05/13/20  07:38    


 


Est GFR ( Amer)  47  (>60)  L  05/13/20  07:38    


 


Est GFR (MDRD) Non-Af  39  (>60)  L  05/13/20  07:38    


 


Glucose  123 mg/dL ()  H  05/13/20  07:38    


 


POC Glucose  99 mg/dL ()   05/13/20  06:16    


 


Hemoglobin A1c %  6.6 % (4.7-6.0)  H  04/25/20  05:20    


 


Lactic Acid  1.2 mmol/L (0.7-2.1)   04/24/20  09:09    


 


Calcium  9.5 mg/dL (8.4-10.2)   05/13/20  07:38    


 


Total Bilirubin  0.6 mg/dL (0.2-1.3)   04/24/20  02:06    


 


Direct Bilirubin  0.2 mg/dL (0.0-0.4)   04/24/20  02:06    


 


Neonat Total Bilirubin  0.4 mg/dL (0.1-1.1)   04/24/20  02:06    


 


Neonat Direct Bilirubin  0.0 mg/dL (0.0-0.3)   04/24/20  02:06    


 


Neonat Indirect Bili  Not Reportable   04/24/20  02:06    


 


AST  58 U/L (14-36)  H  04/24/20  02:06    


 


ALT  38 U/L (<35)  H  04/24/20  02:06    


 


Alkaline Phosphatase  156 U/L ()  H  04/24/20  02:06    


 


Total Protein  7.1 g/dL (6.3-8.2)   04/24/20  02:06    


 


Albumin  3.1 g/dL (3.5-5.0)  L  04/24/20  02:06    


 


Triglycerides  144 mg/dL (<150)   04/25/20  05:20    


 


Cholesterol  86.40 mg/dL (0-200)   04/25/20  05:20    


 


LDL Cholesterol Direct  52 mg/dL (<100)   04/25/20  05:20    


 


VLDL Cholesterol  29.0 mg/dL (10-31)   04/25/20  05:20    


 


HDL Cholesterol  12 mg/dL (>40)  L  04/25/20  05:20    


 


TSH  3.57 uIU/mL (0.47-4.68)   04/25/20  05:20    


 


Serum HCG, Qual  NEGATIVE  (NEGATIVE)   04/24/20  02:06    


 


Urine Color  YELLOW   05/11/20  16:40    


 


Urine Appearance  SLIGHTLY-CLOUDY   05/11/20  16:40    


 


Urine pH  5.0  (5.0-9.0)   05/11/20  16:40    


 


Ur Specific Gravity  1.014   05/11/20  16:40    


 


Urine Protein  NEGATIVE mg/dL (NEGATIVE)   05/11/20  16:40    


 


Urine Glucose (UA)  NEGATIVE mg/dL (NEGATIVE)   05/11/20  16:40    


 


Urine Ketones  NEGATIVE mg/dL (NEGATIVE)   05/11/20  16:40    


 


Urine Blood  SMALL  (NEGATIVE)  H  05/11/20  16:40    


 


Urine Nitrite (Reflex)  NEGATIVE  (NEGATIVE)   05/11/20  16:40    


 


Urine Bilirubin  NEGATIVE  (NEGATIVE)   05/11/20  16:40    


 


Urine Urobilinogen  NEGATIVE mg/dL (<2.0)   05/11/20  16:40    


 


Leukocyte Esterase Rfl  NEGATIVE  (NEGATIVE)   05/11/20  16:40    


 


Urine RBC (Auto)  1 /HPF  05/11/20  16:40    


 


U Hyaline Cast (Auto)  1 /LPF  05/11/20  16:40    


 


Urine WBC (Reflex)  3 /HPF  05/11/20  16:40    


 


Squamous Epi Cells Auto  <1 /HPF  05/11/20  16:40    


 


Urine Mucus (Auto)  RARE /LPF  05/11/20  16:40    


 


Urine Ascorbic Acid  NEGATIVE  (NEGATIVE)   05/11/20  16:40    


 


Time Trough Drawn  1341   04/27/20  13:41    


 


Vancomycin Trough  16.5 ug/mL (5.0-20.0)   04/27/20  13:41    


 


COVID-19 Source  NASOPHARYNGEAL   04/28/20  14:30    


 


COVID-19 (GAMALIEL)  NOT DETECTED   04/28/20  14:30    











Impressions: 


                                        





Extremity Ultrasound  04/24/20 05:34


IMPRESSION:


 


Moderate soft tissue edema/cellulitis of the right gluteal


region. Limitation.


 














Plan


Health Concerns: 


Transfer to skilled nursing facility for wound care.


Time Spent: Greater than 30 Minutes





Stroke


Is this a Stroke Patient?: No





Acute Heart Failure





- **


Is this a Heart Failure Patient?: No

## 2020-05-13 NOTE — PDOC PROGRESS REPORT
Subjective


Progress Note for:: 05/12/20


Subjective:: 





Follow-up I and D of abscess left buttock.  Patient says she feels better


she has no new complaints today


Reason For Visit: 


SEPSIS,BUTTOCKS ABSCESS








Physical Exam


Vital Signs: 


                                        











Temp Pulse Resp BP Pulse Ox


 


 97.9 F   87   12   134/76 H  98 


 


 05/13/20 06:14  05/13/20 06:14  05/13/20 06:14  05/13/20 06:14  05/13/20 06:14








                                 Intake & Output











 05/12/20 05/13/20 05/14/20





 06:59 06:59 06:59


 


Intake Total 3175 3193 800


 


Output Total  3 


 


Balance 3175 3190 800


 


Weight 198.2 kg 198.2 kg 











General appearance: PRESENT: no acute distress, morbidly obese, well-developed, 

well-nourished


Head exam: PRESENT: atraumatic, normocephalic


Eye exam: PRESENT: conjunctiva pink, EOMI, PERRLA.  ABSENT: scleral icterus


Ear exam: PRESENT: normal external ear exam


Mouth exam: PRESENT: moist, tongue midline


Neck exam: ABSENT: carotid bruit, JVD, lymphadenopathy, thyromegaly


Respiratory exam: PRESENT: clear to auscultation viridiana.  ABSENT: rales, rhonchi, 

wheezes


Cardiovascular exam: PRESENT: RRR, +S1, +S2.  ABSENT: diastolic murmur, rubs, 

systolic murmur


Pulses: PRESENT: normal dorsalis pedis pul


Vascular exam: PRESENT: normal capillary refill


GI/Abdominal exam: PRESENT: normal bowel sounds, soft.  ABSENT: distended, 

guarding, mass, organolmegaly, rebound, tenderness


Rectal exam: PRESENT: deferred


Extremities exam: PRESENT: full ROM.  ABSENT: calf tenderness, clubbing, pedal 

edema


Musculoskeletal exam: PRESENT: other - dressing on L buttock


Neurological exam: PRESENT: alert, awake, oriented to person, oriented to place,

oriented to time, oriented to situation, CN II-XII grossly intact.  ABSENT: 

motor sensory deficit


Psychiatric exam: PRESENT: appropriate affect, normal mood.  ABSENT: homicidal 

ideation, suicidal ideation


Skin exam: PRESENT: dry, intact, rash, warm, other.  ABSENT: cyanosis





Results


Laboratory Results: 


                                        





                                 05/11/20 05:03 





                                 05/13/20 07:38 





                                        











  05/13/20





  07:38


 


Sodium  135.7 L


 


Potassium  4.7


 


Chloride  102


 


Carbon Dioxide  22


 


Anion Gap  12


 


BUN  29 H


 


Creatinine  1.52 H


 


Est GFR (African Amer)  47 L


 


Glucose  123 H


 


Calcium  9.5











Impressions: 


                                        





Extremity Ultrasound  04/24/20 05:34


IMPRESSION:


 


Moderate soft tissue edema/cellulitis of the right gluteal


region. Limitation.


 














Assessment and Plan





- Diagnosis


(1) Perirectal abscess


Is this a current diagnosis for this admission?: Yes   





(2) Morbid obesity with BMI of 70 and over, adult


Is this a current diagnosis for this admission?: Yes   





(3) Sepsis


Qualifiers: 


   Sepsis type: sepsis due to unspecified organism   Sepsis acute organ 

dysfunction status: unspecified   Qualified Code(s): A41.9 - Sepsis, unspecified

organism   


Is this a current diagnosis for this admission?: Yes   





(4) Hypertension


Is this a current diagnosis for this admission?: Yes   





(5) Prediabetes


Is this a current diagnosis for this admission?: Yes   





(6) Hyperkalemia


Is this a current diagnosis for this admission?: Yes   





- Plan Summary


Summary: 


5/12 


Awaiting transfer to skilled nursing facility.  Patient noted to be hypokalemic 

today.  She received adequate potassium lowering agents and will recheck her 

potassium tomorrow.  If stable she can be discharged